# Patient Record
Sex: FEMALE | Race: WHITE | NOT HISPANIC OR LATINO | Employment: FULL TIME | URBAN - METROPOLITAN AREA
[De-identification: names, ages, dates, MRNs, and addresses within clinical notes are randomized per-mention and may not be internally consistent; named-entity substitution may affect disease eponyms.]

---

## 2021-05-02 ENCOUNTER — APPOINTMENT (EMERGENCY)
Dept: RADIOLOGY | Facility: HOSPITAL | Age: 46
End: 2021-05-02

## 2021-05-02 ENCOUNTER — HOSPITAL ENCOUNTER (EMERGENCY)
Facility: HOSPITAL | Age: 46
Discharge: HOME | End: 2021-05-02
Attending: EMERGENCY MEDICINE

## 2021-05-02 VITALS
SYSTOLIC BLOOD PRESSURE: 99 MMHG | RESPIRATION RATE: 18 BRPM | OXYGEN SATURATION: 98 % | TEMPERATURE: 97.8 F | WEIGHT: 181.66 LBS | DIASTOLIC BLOOD PRESSURE: 62 MMHG | HEART RATE: 58 BPM

## 2021-05-02 DIAGNOSIS — R07.9 CHEST PAIN: Primary | ICD-10-CM

## 2021-05-02 DIAGNOSIS — M79.669 CALF PAIN: ICD-10-CM

## 2021-05-02 DIAGNOSIS — S80.10XA SUPERFICIAL BRUISING OF LOWER LEG: ICD-10-CM

## 2021-05-02 DIAGNOSIS — R06.02 SHORTNESS OF BREATH: ICD-10-CM

## 2021-05-02 LAB
ALBUMIN SERPL BCP-MCNC: 3.4 G/DL (ref 3.5–5)
ALP SERPL-CCNC: 82 U/L (ref 46–116)
ALT SERPL W P-5'-P-CCNC: 23 U/L (ref 12–78)
ANION GAP SERPL CALCULATED.3IONS-SCNC: 7 MMOL/L (ref 4–13)
APTT PPP: 34 SECONDS (ref 23–37)
AST SERPL W P-5'-P-CCNC: 15 U/L (ref 5–45)
ATRIAL RATE: 75 BPM
BASOPHILS # BLD AUTO: 0.02 THOUSANDS/ΜL (ref 0–0.1)
BASOPHILS NFR BLD AUTO: 0 % (ref 0–1)
BILIRUB DIRECT SERPL-MCNC: 0.11 MG/DL (ref 0–0.2)
BILIRUB SERPL-MCNC: 0.32 MG/DL (ref 0.2–1)
BUN SERPL-MCNC: 16 MG/DL (ref 5–25)
CALCIUM SERPL-MCNC: 8.7 MG/DL (ref 8.3–10.1)
CHLORIDE SERPL-SCNC: 106 MMOL/L (ref 100–108)
CO2 SERPL-SCNC: 27 MMOL/L (ref 21–32)
CREAT SERPL-MCNC: 1.06 MG/DL (ref 0.6–1.3)
D DIMER PPP FEU-MCNC: <0.27 UG/ML FEU
EOSINOPHIL # BLD AUTO: 0.05 THOUSAND/ΜL (ref 0–0.61)
EOSINOPHIL NFR BLD AUTO: 1 % (ref 0–6)
ERYTHROCYTE [DISTWIDTH] IN BLOOD BY AUTOMATED COUNT: 12.2 % (ref 11.6–15.1)
EXT PREG TEST URINE: NEGATIVE
EXT. CONTROL ED NAV: NORMAL
GFR SERPL CREATININE-BSD FRML MDRD: 63 ML/MIN/1.73SQ M
GLUCOSE SERPL-MCNC: 86 MG/DL (ref 65–140)
HCT VFR BLD AUTO: 36.9 % (ref 34.8–46.1)
HGB BLD-MCNC: 12.1 G/DL (ref 11.5–15.4)
IMM GRANULOCYTES # BLD AUTO: 0.01 THOUSAND/UL (ref 0–0.2)
IMM GRANULOCYTES NFR BLD AUTO: 0 % (ref 0–2)
INR PPP: 1 (ref 0.84–1.19)
LYMPHOCYTES # BLD AUTO: 1.72 THOUSANDS/ΜL (ref 0.6–4.47)
LYMPHOCYTES NFR BLD AUTO: 32 % (ref 14–44)
MCH RBC QN AUTO: 31.3 PG (ref 26.8–34.3)
MCHC RBC AUTO-ENTMCNC: 32.8 G/DL (ref 31.4–37.4)
MCV RBC AUTO: 96 FL (ref 82–98)
MONOCYTES # BLD AUTO: 0.38 THOUSAND/ΜL (ref 0.17–1.22)
MONOCYTES NFR BLD AUTO: 7 % (ref 4–12)
NEUTROPHILS # BLD AUTO: 3.16 THOUSANDS/ΜL (ref 1.85–7.62)
NEUTS SEG NFR BLD AUTO: 60 % (ref 43–75)
NRBC BLD AUTO-RTO: 0 /100 WBCS
P AXIS: 32 DEGREES
PLATELET # BLD AUTO: 164 THOUSANDS/UL (ref 149–390)
PMV BLD AUTO: 10.4 FL (ref 8.9–12.7)
POTASSIUM SERPL-SCNC: 3.8 MMOL/L (ref 3.5–5.3)
PR INTERVAL: 148 MS
PROT SERPL-MCNC: 6.8 G/DL (ref 6.4–8.2)
PROTHROMBIN TIME: 13 SECONDS (ref 11.6–14.5)
QRS AXIS: 68 DEGREES
QRSD INTERVAL: 72 MS
QT INTERVAL: 358 MS
QTC INTERVAL: 399 MS
RBC # BLD AUTO: 3.86 MILLION/UL (ref 3.81–5.12)
SODIUM SERPL-SCNC: 140 MMOL/L (ref 136–145)
T WAVE AXIS: 49 DEGREES
TROPONIN I SERPL-MCNC: <0.02 NG/ML
TROPONIN I SERPL-MCNC: <0.02 NG/ML
VENTRICULAR RATE: 75 BPM
WBC # BLD AUTO: 5.34 THOUSAND/UL (ref 4.31–10.16)

## 2021-05-02 PROCEDURE — 85730 THROMBOPLASTIN TIME PARTIAL: CPT | Performed by: PHYSICIAN ASSISTANT

## 2021-05-02 PROCEDURE — 81025 URINE PREGNANCY TEST: CPT | Performed by: PHYSICIAN ASSISTANT

## 2021-05-02 PROCEDURE — 84484 ASSAY OF TROPONIN QUANT: CPT | Performed by: PHYSICIAN ASSISTANT

## 2021-05-02 PROCEDURE — 96374 THER/PROPH/DIAG INJ IV PUSH: CPT

## 2021-05-02 PROCEDURE — 85610 PROTHROMBIN TIME: CPT | Performed by: PHYSICIAN ASSISTANT

## 2021-05-02 PROCEDURE — 85025 COMPLETE CBC W/AUTO DIFF WBC: CPT | Performed by: PHYSICIAN ASSISTANT

## 2021-05-02 PROCEDURE — 93010 ELECTROCARDIOGRAM REPORT: CPT | Performed by: INTERNAL MEDICINE

## 2021-05-02 PROCEDURE — 36415 COLL VENOUS BLD VENIPUNCTURE: CPT | Performed by: PHYSICIAN ASSISTANT

## 2021-05-02 PROCEDURE — 80048 BASIC METABOLIC PNL TOTAL CA: CPT | Performed by: PHYSICIAN ASSISTANT

## 2021-05-02 PROCEDURE — 93005 ELECTROCARDIOGRAM TRACING: CPT

## 2021-05-02 PROCEDURE — 99285 EMERGENCY DEPT VISIT HI MDM: CPT

## 2021-05-02 PROCEDURE — 80076 HEPATIC FUNCTION PANEL: CPT | Performed by: PHYSICIAN ASSISTANT

## 2021-05-02 PROCEDURE — 99285 EMERGENCY DEPT VISIT HI MDM: CPT | Performed by: PHYSICIAN ASSISTANT

## 2021-05-02 PROCEDURE — 71045 X-RAY EXAM CHEST 1 VIEW: CPT

## 2021-05-02 PROCEDURE — 85379 FIBRIN DEGRADATION QUANT: CPT | Performed by: PHYSICIAN ASSISTANT

## 2021-05-02 RX ORDER — KETOROLAC TROMETHAMINE 30 MG/ML
30 INJECTION, SOLUTION INTRAMUSCULAR; INTRAVENOUS ONCE
Status: COMPLETED | OUTPATIENT
Start: 2021-05-02 | End: 2021-05-02

## 2021-05-02 RX ORDER — ASPIRIN 81 MG/1
324 TABLET, CHEWABLE ORAL ONCE
Status: COMPLETED | OUTPATIENT
Start: 2021-05-02 | End: 2021-05-02

## 2021-05-02 RX ADMIN — KETOROLAC TROMETHAMINE 30 MG: 30 INJECTION, SOLUTION INTRAMUSCULAR; INTRAVENOUS at 20:44

## 2021-05-02 RX ADMIN — ASPIRIN 324 MG: 81 TABLET, CHEWABLE ORAL at 20:43

## 2021-05-02 NOTE — ED PROVIDER NOTES
History  Chief Complaint   Patient presents with    Chest Pain     pt reports chest pain, sob, right leg pain/bruise  CP/sob started one hour ago  states she was in the car for 25 hours total in the past few days  76-year-old female with past medical history significant for anemia and WPW s/p ablation and resolution who presents to the emergency department accompanied by female partner at bedside for complaint of right calf pain and bruising, as well as chest pain and shortness of breath  Patient reports she traveled a total of 25 hours in a car in the past few days  Her last trip was 5 hours  Yesterday, she noticed a dull aching sensation in the right calf, which progressed to bruising on the anterior shin and worsening calf pain, radiating up the leg to the thigh  A few hours ago, patient reports she became acutely short of breath, both at rest and on exertion, with accompanying chest discomfort  She denies any dizziness, near-syncope or syncope, nausea or vomiting  She denies any trauma to lower leg  There is no history of DVT or PE, recent surgery or immobilization, history of malignancy, exogenous estrogen use  She is not a tobacco smoker or vaper  None       Past Medical History:   Diagnosis Date    Anemia        Past Surgical History:   Procedure Laterality Date    APPENDECTOMY       SECTION      NEPHRECTOMY         History reviewed  No pertinent family history  I have reviewed and agree with the history as documented  E-Cigarette/Vaping     E-Cigarette/Vaping Substances     Social History     Tobacco Use    Smoking status: Never Smoker   Substance Use Topics    Alcohol use: Never     Frequency: Never    Drug use: Never       Review of Systems   Constitutional: Negative for chills, fatigue and fever  HENT: Negative for congestion, rhinorrhea and sore throat  Respiratory: Positive for shortness of breath  Negative for cough, chest tightness and wheezing  Cardiovascular: Positive for chest pain and leg swelling  Negative for palpitations  Gastrointestinal: Negative for abdominal pain, nausea and vomiting  Musculoskeletal: Positive for myalgias  Negative for arthralgias, gait problem, joint swelling, neck pain and neck stiffness  Skin: Positive for color change  Negative for rash and wound  Neurological: Negative for dizziness, syncope, weakness, light-headedness and headaches  Hematological: Negative for adenopathy  All other systems reviewed and are negative  Physical Exam  Physical Exam  Vitals signs reviewed  Constitutional:       General: She is awake  She is not in acute distress  Appearance: Normal appearance  She is well-developed  She is not ill-appearing or toxic-appearing  HENT:      Head: Normocephalic and atraumatic  Mouth/Throat:      Lips: Pink  Mouth: Mucous membranes are moist       Pharynx: Oropharynx is clear  Uvula midline  Eyes:      Extraocular Movements: Extraocular movements intact  Conjunctiva/sclera: Conjunctivae normal       Pupils: Pupils are equal, round, and reactive to light  Neck:      Musculoskeletal: Full passive range of motion without pain, normal range of motion and neck supple  Cardiovascular:      Rate and Rhythm: Normal rate and regular rhythm  Pulses: Normal pulses  Pulmonary:      Effort: Pulmonary effort is normal       Breath sounds: Normal breath sounds and air entry  Chest:      Chest wall: No tenderness  Musculoskeletal: Normal range of motion  Right ankle: She exhibits normal range of motion, no swelling, no ecchymosis, no deformity, no laceration and normal pulse  No tenderness  Right lower leg: She exhibits tenderness  She exhibits no bony tenderness, no swelling (+bruising (in multiple stages of healing) of shin), no deformity and no laceration  Legs:       Right foot: Normal range of motion and normal capillary refill   No tenderness, bony tenderness, swelling, crepitus or deformity  Comments: Compartments soft; sensation intact proximally and distally; skin warm; able to ambulate independently    Skin:     General: Skin is warm  Capillary Refill: Capillary refill takes less than 2 seconds  Findings: No erythema, lesion or rash  Neurological:      Mental Status: She is alert and oriented to person, place, and time  Psychiatric:         Behavior: Behavior is cooperative           Vital Signs  ED Triage Vitals   Temperature Pulse Respirations Blood Pressure SpO2   05/02/21 1929 05/02/21 1929 05/02/21 1929 05/02/21 1934 05/02/21 1929   97 8 °F (36 6 °C) 86 16 131/61 98 %      Temp Source Heart Rate Source Patient Position - Orthostatic VS BP Location FiO2 (%)   05/02/21 1929 05/02/21 1929 05/02/21 2145 05/02/21 2145 --   Oral Monitor Lying Right arm       Pain Score       05/02/21 2044       6           Vitals:    05/02/21 1929 05/02/21 1934 05/02/21 2145 05/02/21 2311   BP:  131/61 104/57 99/62   Pulse: 86  63 58   Patient Position - Orthostatic VS:   Lying Lying         Visual Acuity      ED Medications  Medications   aspirin chewable tablet 324 mg (324 mg Oral Given 5/2/21 2043)   ketorolac (TORADOL) injection 30 mg (30 mg Intravenous Given 5/2/21 2044)       Diagnostic Studies  Results Reviewed     Procedure Component Value Units Date/Time    Troponin I [[id number]]  (Normal) Collected: 05/02/21 2239    Lab Status: Final result Specimen: Blood from Arm, Right Updated: 05/02/21 2300     Troponin I <0 02 ng/mL     POCT pregnancy, urine [[id number]]  (Normal) Resulted: 05/02/21 2021    Lab Status: Final result Updated: 05/02/21 2021     EXT PREG TEST UR (Ref: Negative) negative     Control valid    Troponin I [[id number]]  (Normal) Collected: 05/02/21 1946    Lab Status: Final result Specimen: Blood from Arm, Right Updated: 05/02/21 2010     Troponin I <0 02 ng/mL     Basic metabolic panel [[id number]] Collected: 05/02/21 1946    Lab Status: Final result Specimen: Blood from Arm, Right Updated: 05/02/21 2008     Sodium 140 mmol/L      Potassium 3 8 mmol/L      Chloride 106 mmol/L      CO2 27 mmol/L      ANION GAP 7 mmol/L      BUN 16 mg/dL      Creatinine 1 06 mg/dL      Glucose 86 mg/dL      Calcium 8 7 mg/dL      eGFR 63 ml/min/1 73sq m     Narrative:      [place] guidelines for Chronic Kidney Disease (CKD):     Stage 1 with normal or high GFR (GFR > 90 mL/min/1 73 square meters)    Stage 2 Mild CKD (GFR = 60-89 mL/min/1 73 square meters)    Stage 3A Moderate CKD (GFR = 45-59 mL/min/1 73 square meters)    Stage 3B Moderate CKD (GFR = 30-44 mL/min/1 73 square meters)    Stage 4 Severe CKD (GFR = 15-29 mL/min/1 73 square meters)    Stage 5 End Stage CKD (GFR <15 mL/min/1 73 square meters)  Note: GFR calculation is accurate only with a steady state creatinine    Hepatic function panel [[id number]]  (Abnormal) Collected: 05/02/21 1946    Lab Status: Final result Specimen: Blood from Arm, Right Updated: 05/02/21 2008     Total Bilirubin 0 32 mg/dL      Bilirubin, Direct 0 11 mg/dL      Alkaline Phosphatase 82 U/L      AST 15 U/L      ALT 23 U/L      Total Protein 6 8 g/dL      Albumin 3 4 g/dL     D-Dimer [[id number]]  (Normal) Collected: 05/02/21 1946    Lab Status: Final result Specimen: Blood from Arm, Right Updated: 05/02/21 2007     D-Dimer, Quant <0 27 ug/ml FEU     Protime-INR [[id number]]  (Normal) Collected: 05/02/21 1946    Lab Status: Final result Specimen: Blood from Arm, Right Updated: 05/02/21 2004     Protime 13 0 seconds      INR 1 00    APTT [[id number]]  (Normal) Collected: 05/02/21 1946    Lab Status: Final result Specimen: Blood from Arm, Right Updated: 05/02/21 2004     PTT 34 seconds     CBC and differential [[id number]] Collected: 05/02/21 1946    Lab Status: Final result Specimen: Blood from Arm, Right Updated: 05/02/21 1953     WBC 5 34 Thousand/uL      RBC 3 86 Million/uL      Hemoglobin 12 1 g/dL      Hematocrit 36 9 %      MCV 96 fL      MCH 31 3 pg      MCHC 32 8 g/dL      RDW 12 2 %      MPV 10 4 fL      Platelets 253 Thousands/uL      nRBC 0 /100 WBCs      Neutrophils Relative 60 %      Immat GRANS % 0 %      Lymphocytes Relative 32 %      Monocytes Relative 7 %      Eosinophils Relative 1 %      Basophils Relative 0 %      Neutrophils Absolute 3 16 Thousands/µL      Immature Grans Absolute 0 01 Thousand/uL      Lymphocytes Absolute 1 72 Thousands/µL      Monocytes Absolute 0 38 Thousand/µL      Eosinophils Absolute 0 05 Thousand/µL      Basophils Absolute 0 02 Thousands/µL                  XR chest 1 view portable    (Results Pending)              Procedures  Procedures         ED Course  ED Course as of May 03 0448   Sun May 02, 2021   1938 EKG shows normal sinus rhythm, rate 75, no acute ST segment elevation or depression, no T-wave inversion, , QRS duration 72, IA interval 148, no arrhythmia or heart block      2237 EKG shows junctional rhythm, rate 61, no acute ST segment elevation or depression, no T-wave inversion, , QRS duration 62, no arrhythmia or heart block      2305 Labs overall unremarkable  Negative dimer  Trop negative x2  No EKG changes  On re-assessment, patient reports she feels better and is resting comfortably  Vitals are improved  Should f/u with PCP for further evaluation  HEART Risk Score      Most Recent Value   Heart Score Risk Calculator   History  0 Filed at: 05/02/2021 2303   ECG  0 Filed at: 05/02/2021 2303   Age  1 Filed at: 05/02/2021 2303   Risk Factors  0 Filed at: 05/02/2021 2303   Troponin  0 Filed at: 05/02/2021 2303   HEART Score  1 Filed at: 05/02/2021 2303                      SBIRT 22yo+      Most Recent Value   SBIRT (25 yo +)   In order to provide better care to our patients, we are screening all of our patients for alcohol and drug use  Would it be okay to ask you these screening questions?   No Filed at: 05/02/2021 2016 MDM  Number of Diagnoses or Management Options  Calf pain:   Chest pain:   Shortness of breath:   Superficial bruising of lower leg:   Diagnosis management comments: On exam, well-appearing female, no acute distress, nontoxic appearance, afebrile, vitals unremarkable, awake alert oriented, + bruising on the right shin, + calf swelling with posterior tenderness, no varicose veins or palpable cord or lump, lungs clear to auscultation bilaterally, no tachypnea or hypoxia, no signs of respiratory distress, resting comfortably sitting down  Will screen for clot with dimer and obtain PE study of elevated  Will check chest x-ray for any viral pattern which may suggest COVID-19 infection, given recent travel  Also consider ACS, GERD, MSK chest wall pain, anxiety        Amount and/or Complexity of Data Reviewed  Clinical lab tests: ordered and reviewed  Tests in the radiology section of CPT®: ordered and reviewed  Tests in the medicine section of CPT®: ordered and reviewed  Discussion of test results with the performing providers: yes  Decide to obtain previous medical records or to obtain history from someone other than the patient: yes  Obtain history from someone other than the patient: yes  Review and summarize past medical records: yes  Discuss the patient with other providers: yes  Independent visualization of images, tracings, or specimens: yes    Patient Progress  Patient progress: improved (See ED course note for dispo and plan  I reviewed and discussed all lab and imaging findings with the patient at bedside  I discussed emergency department return parameters  I answered any and all questions the patient had regarding emergency department course of evaluation and treatment   The patient verbalized understanding of and agreement with plan   )      Disposition  Final diagnoses:   Chest pain   Shortness of breath   Calf pain   Superficial bruising of lower leg     Time reflects when diagnosis was documented in both MDM as applicable and the Disposition within this note     Time User Action Codes Description Comment    5/2/2021 11:03 PM Raymon Primas Add [R07 9] Chest pain     5/2/2021 11:03 PM Raymon Primas Add [R06 02] Shortness of breath     5/2/2021 11:03 PM Raymon Primas Add [J56 280] Calf pain     5/2/2021 11:04 PM Raymon Primas Add [S80 10XA] Superficial bruising of lower leg       ED Disposition     ED Disposition Condition Date/Time Comment    Discharge Stable Sun May 2, 2021 11:03 PM Mark Cook discharge to home/self care  Follow-up Information     Follow up With Specialties Details Why Contact Info Additional Information    [address] [facility] Emergency Medicine Go to  If symptoms worsen [facility] [address]  [facility] Department, [address] [place] [address], [place] [address] Primary Care Family Medicine Schedule an appointment as soon as possible for a visit in 1 day For further evaluation [facility]  [facility] [address] [address], [address] [place] [address], [id number] [phone]          There are no discharge medications for this patient  No discharge procedures on file      PDMP Review     None          ED Provider  Electronically Signed by           Drake Vasquez PA-C  05/03/21 0035

## 2021-05-03 LAB
ATRIAL RATE: 61 BPM
QRS AXIS: 62 DEGREES
QRSD INTERVAL: 62 MS
QT INTERVAL: 404 MS
QTC INTERVAL: 406 MS
T WAVE AXIS: 43 DEGREES
VENTRICULAR RATE: 61 BPM

## 2021-05-03 PROCEDURE — 93010 ELECTROCARDIOGRAM REPORT: CPT | Performed by: INTERNAL MEDICINE

## 2021-09-23 ENCOUNTER — APPOINTMENT (OUTPATIENT)
Dept: URGENT CARE | Facility: CLINIC | Age: 46
End: 2021-09-23

## 2021-09-23 DIAGNOSIS — Z02.1 PRE-EMPLOYMENT HEALTH SCREENING EXAMINATION: Primary | ICD-10-CM

## 2021-09-23 LAB — RUBV IGG SERPL IA-ACNC: 154.8 IU/ML

## 2021-09-23 PROCEDURE — 86787 VARICELLA-ZOSTER ANTIBODY: CPT | Performed by: PHYSICIAN ASSISTANT

## 2021-09-23 PROCEDURE — 86480 TB TEST CELL IMMUN MEASURE: CPT | Performed by: PHYSICIAN ASSISTANT

## 2021-09-23 PROCEDURE — 86765 RUBEOLA ANTIBODY: CPT | Performed by: PHYSICIAN ASSISTANT

## 2021-09-23 PROCEDURE — 86762 RUBELLA ANTIBODY: CPT | Performed by: PHYSICIAN ASSISTANT

## 2021-09-23 PROCEDURE — 86735 MUMPS ANTIBODY: CPT | Performed by: PHYSICIAN ASSISTANT

## 2021-09-24 LAB — VZV IGG SER IA-ACNC: NORMAL

## 2021-09-27 LAB
GAMMA INTERFERON BACKGROUND BLD IA-ACNC: 0.18 IU/ML
M TB IFN-G BLD-IMP: NEGATIVE
M TB IFN-G CD4+ BCKGRND COR BLD-ACNC: 0.21 IU/ML
M TB IFN-G CD4+ BCKGRND COR BLD-ACNC: 0.26 IU/ML
MITOGEN IGNF BCKGRD COR BLD-ACNC: >10 IU/ML

## 2021-09-28 LAB
MEV IGG SER QL: NORMAL
MUV IGG SER QL: NORMAL

## 2021-10-20 ENCOUNTER — TELEPHONE (OUTPATIENT)
Dept: NEUROLOGY | Facility: CLINIC | Age: 46
End: 2021-10-20

## 2022-01-31 ENCOUNTER — TELEPHONE (OUTPATIENT)
Dept: NEUROLOGY | Facility: CLINIC | Age: 47
End: 2022-01-31

## 2022-01-31 NOTE — TELEPHONE ENCOUNTER
Called patient and I left a message to call back to reschedule her 05/17/22 appointment with Dr Melani Raman as she will be out of the office  This can be rescheduled with an AP that does new patient's for 60 minutes

## 2022-02-09 ENCOUNTER — TELEPHONE (OUTPATIENT)
Dept: NEUROLOGY | Facility: CLINIC | Age: 47
End: 2022-02-09

## 2022-02-09 NOTE — TELEPHONE ENCOUNTER
Tana called and asked for a sooner appointment then 2/23/22 because her headaches are be coming more frequent and now see is seeing floaters as well  I reschedule her with Ciro Eason at the [facility] location for 8am,

## 2022-02-14 ENCOUNTER — TELEPHONE (OUTPATIENT)
Dept: NEUROLOGY | Facility: CLINIC | Age: 47
End: 2022-02-14

## 2022-02-14 NOTE — TELEPHONE ENCOUNTER
Called and left a voicemail for patient - Please call back to confirm upcoming appointment with [facility]  Provided patient with apt date, time and location  Informed patient that check in is at least 15 minutes prior to apt time

## 2022-02-17 ENCOUNTER — OFFICE VISIT (OUTPATIENT)
Dept: NEUROLOGY | Facility: CLINIC | Age: 47
End: 2022-02-17
Payer: COMMERCIAL

## 2022-02-17 VITALS
TEMPERATURE: 98 F | WEIGHT: 194 LBS | SYSTOLIC BLOOD PRESSURE: 106 MMHG | HEIGHT: 65 IN | DIASTOLIC BLOOD PRESSURE: 60 MMHG | RESPIRATION RATE: 16 BRPM | HEART RATE: 77 BPM | BODY MASS INDEX: 32.32 KG/M2

## 2022-02-17 DIAGNOSIS — H53.8 BLURRED VISION, RIGHT EYE: ICD-10-CM

## 2022-02-17 DIAGNOSIS — G43.009 MIGRAINE WITHOUT AURA AND WITHOUT STATUS MIGRAINOSUS, NOT INTRACTABLE: ICD-10-CM

## 2022-02-17 DIAGNOSIS — R20.2 PARESTHESIAS: ICD-10-CM

## 2022-02-17 DIAGNOSIS — R42 DIZZINESS: Primary | ICD-10-CM

## 2022-02-17 DIAGNOSIS — R53.1 RIGHT SIDED WEAKNESS: ICD-10-CM

## 2022-02-17 DIAGNOSIS — R07.89 CHEST PRESSURE: ICD-10-CM

## 2022-02-17 PROCEDURE — 99204 OFFICE O/P NEW MOD 45 MIN: CPT | Performed by: NURSE PRACTITIONER

## 2022-02-17 RX ORDER — KETOROLAC TROMETHAMINE 10 MG/1
10 TABLET, FILM COATED ORAL 3 TIMES DAILY PRN
COMMUNITY
Start: 2022-01-25

## 2022-02-17 RX ORDER — METHYLPREDNISOLONE 4 MG/1
TABLET ORAL AS NEEDED
COMMUNITY
Start: 2021-12-20

## 2022-02-17 RX ORDER — RIZATRIPTAN BENZOATE 10 MG/1
10 TABLET ORAL
COMMUNITY
Start: 2021-09-27 | End: 2022-09-27

## 2022-02-17 RX ORDER — FERROUS SULFATE 325(65) MG
1 TABLET ORAL
COMMUNITY
Start: 2021-12-06

## 2022-02-17 RX ORDER — HYDROXYCHLOROQUINE SULFATE 200 MG/1
200 TABLET, FILM COATED ORAL 2 TIMES DAILY
COMMUNITY
Start: 2021-12-20

## 2022-02-17 NOTE — ASSESSMENT & PLAN NOTE
Intermittent episodes of right sided weakness/paresthesias that started 5 months ago and occur for days-weeks at a time  Nonspecific white matter changes on MRI performed in September  Exam today is nonfocal-patient is currently asymptomatic  Recommend she let us know if any repeat episodes, get MRI performed to help rule out demyelinating cause and follow up with Dr Devora Nissen in 2 months

## 2022-02-17 NOTE — ASSESSMENT & PLAN NOTE
Longstanding migraines  No recent change in frequency, location, intensity  1x every couple of months  Easily aborted by maxalt or Toradol-not overusing  Continue current treatment

## 2022-02-17 NOTE — ASSESSMENT & PLAN NOTE
Had cardiac workup-ok  Describes pressure feeling that goes all around the upper thorax- responded to steroids in past   MRI's for further evaluation

## 2022-02-17 NOTE — PATIENT INSTRUCTIONS
Get blood work as scheduled  Do MRI's- get discs from [facility]  We will request recent eye exam results  Let us know of repeat symptoms  Continue healthy lifestyle  Follow up in 2 months

## 2022-02-17 NOTE — PROGRESS NOTES
Patient ID: Claudia Morel is a 51 y o  female  Assessment/Plan:  Patient Instructions:  Get blood work as scheduled  Do MRI's- get discs from [facility]  We will request recent eye exam results  Let us know of repeat symptoms  Continue healthy lifestyle  Follow up in 2 months    Paresthesias  Intermittent episodes of right sided weakness/paresthesias that started 5 months ago and occur for days-weeks at a time  Nonspecific white matter changes on MRI performed in September  Exam today is nonfocal-patient is currently asymptomatic  Recommend she let us know if any repeat episodes, get MRI performed to help rule out demyelinating cause and follow up with Dr Fuad Jasso in 2 months  Migraine without aura and without status migrainosus, not intractable  Longstanding migraines  No recent change in frequency, location, intensity  1x every couple of months  Easily aborted by maxalt or Toradol-not overusing  Continue current treatment  Blurred vision, right eye  Will request recent eye exam  Suggest she get repeat eye exam if possible when symptomatic    Chest pressure  Had cardiac workup-ok  Describes pressure feeling that goes all around the upper thorax- responded to steroids in past   MRI's for further evaluation    We did discuss red flag headache/neurological symptoms and reasons to seek more emergent medical attention       Diagnoses and all orders for this visit:    Dizziness  -     MRI brain MS wo and w contrast; Future    Right sided weakness  Comments:  intermittent  Orders:  -     MRI brain MS wo and w contrast; Future  -     MRI cervical spine with and without contrast; Future  -     MRI thoracic spine with and without contrast; Future    Paresthesias  -     MRI brain MS wo and w contrast; Future  -     MRI cervical spine with and without contrast; Future  -     MRI thoracic spine with and without contrast; Future    Migraine without aura and without status migrainosus, not intractable    Chest pressure  -     MRI thoracic spine with and without contrast; Future    Blurred vision, right eye    Other orders  -     cyanocobalamin (VITAMIN B-12) 1000 MCG tablet; Take 1,000 mcg by mouth daily  -     ferrous sulfate 325 (65 Fe) mg tablet; Take 1 tablet by mouth daily with breakfast  -     hydroxychloroquine (PLAQUENIL) 200 mg tablet; Take 200 mg by mouth 2 (two) times a day  -     ketorolac (TORADOL) 10 mg tablet; Take 10 mg by mouth Three times daily as needed  -     methylPREDNISolone 4 MG tablet therapy pack; if needed    -     rizatriptan (MAXALT) 10 MG tablet; Take 10 mg by mouth         Subjective:    HPI  Cheikh Gonzalez is a 51year old female who presents to the office today as a new patient for evaluation of intermittent primarily right sided paresthesias and weakness and also intermittent right blurred vision not always associated with headache  She is with her spouse who also helps provide history  She has a medical history/surgical history of iron deficiency (being treated po- had infusions in past), elevated AMAURY (1:640; repeat 1:320) diagnosed by rheumatology with UCTD and on plaquenil now, gastric sleeve surgery in 2017- lost 100 lbs and kept it off since then- on vitamins, migraine for many years which never needed prevention and has been stable at 1x every couple months treated effectively in 2 hours by maxalt or toradol prn, currie parkinson white s/p ablation without repeat episodes, 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, c sections  She does not use tobacco, alcohol, or drugs  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 from [employer] in October); she does do 24 hour shifts at times; she is also busy with PA school  She states she hydrates well with water; has regular diet/appetite, and is using bathroom without difficulty  She denies any significant sleep difficulty  She does keep active by doing hikes/walks  The first time she ever had these symptoms was 9/11/2021   She states she had just got done riding a motorcycle and she felt as if her face and her back were wet  She was speaking with her spouse and she noticed slurred speech  She was also off balance with walking  It took the patient time to be convinced to go to ED, but symptoms had resolved some when she arrived (except the right sided numbness)  While there she got aspirin and they ruled out stroke with use of MRI/MRA  She was discharged  She states since discharge she has had repeat episodes of right sided paresthesias that can last for days to a week at a time  Sometimes it will just be the face, other times the right arm/right leg  Sometimes it is accompanied by right eye blurred vision/peripheral vision loss without eye pain  There are no specific triggers for these episodes and often they are not accompanied by headaches  More recently she has also had intermittent chest "squeezing" /pressure  She has been treated with medrol dose pack by her rheumatologist  Her last episode was 1 week ago  She has upcoming repeat lab work  She has noticed no difference in these symptoms since being on plaquenil  Testing:  BUN 14  Creat 0 86  Na136  K 4 0  AST 23  ALT21  GFR 81  TSH 2 19  CRP 4 2  ACE 25  Normal SPEP  Sed rate 16  Neg lyme  Ferritin 11  Normal Iron panel    B12 468  RF neg  AMAURY pos- neg reflex for SSA/Smith/SCL-70, dsDNA    EKG 9/11/2021:  INTERPRETATION:   SINUS BRADYCARDIA   OTHERWISE NORMAL ECG   WHEN COMPARED WITH ECG OF 14-JUL-2021 17:55,   NO SIGNIFICANT CHANGE WAS FOUND   Confirmed by LACHO CARREON, Edwin Peñaloza ([id number]) on 9/11/2021 7:41:12 AM    MRI/MRA Brain/neck w/wo 9/11/2021:  MRI of the brain:   Brain parenchyma: Normal for age  There are a few scattered nonspecific foci of   gliosis or demyelination in the white matter  Ventricular system, basal cisterns and extra-axial spaces: Appropriate for age  Major vascular structures: Normal    Intracranial hemorrhage: None  Midline shift: None     Skull base & Calvarium: Normal    Paranasal sinuses and mastoid air cells: Clear  Visualized orbits: Normal    Sella: Normal      MR angiogram of the [place]:   Internal Carotid Arteries: No hemodynamically significant stenosis or saccular   aneurysm  Anterior Cerebral Arteries: No hemodynamically significant stenosis or saccular   aneurysm  Middle Cerebral Arteries: No hemodynamically significant stenosis or saccular   aneurysm  Vertebrobasilar system: No hemodynamically significant stenosis or saccular   aneurysm  MR angiogram of the neck:   Aortic arch & Origins: Vessel origins from the aortic arch are patent  Left Carotid:No hemodynamically significant stenosis  Right Carotid: No hemodynamically significant stenosis  Left Vertebral: No hemodynamically significant stenosis  Right Vertebral: No hemodynamically significant stenosis  The following portions of the patient's history were reviewed and updated as appropriate: allergies, current medications, past family history, past medical history, past social history, past surgical history and problem list          Objective:    Blood pressure 106/60, pulse 77, temperature 98 °F (36 7 °C), temperature source Temporal, resp  rate 16, height 5' 5" (1 651 m), weight 88 kg (194 lb)  Physical Exam  Vitals reviewed  Constitutional:       General: She is not in acute distress  Appearance: She is not ill-appearing, toxic-appearing or diaphoretic  HENT:      Head: Normocephalic  Right Ear: External ear normal       Left Ear: External ear normal       Mouth/Throat:      Mouth: Mucous membranes are moist       Pharynx: Oropharynx is clear  Eyes:      General: Lids are normal          Right eye: No discharge  Left eye: No discharge  Extraocular Movements: Extraocular movements intact  Pupils: Pupils are equal, round, and reactive to light        Comments: Fundi appear normal on this undilated exam   Cardiovascular: Rate and Rhythm: Normal rate and regular rhythm  Pulses: Normal pulses  Heart sounds: Normal heart sounds  Pulmonary:      Effort: Pulmonary effort is normal       Breath sounds: Normal breath sounds  Abdominal:      General: Bowel sounds are normal  There is no distension  Musculoskeletal:         General: Normal range of motion  Cervical back: Normal range of motion  No rigidity or tenderness  Right lower leg: No edema  Left lower leg: No edema  Skin:     General: Skin is warm and dry  Neurological:      General: No focal deficit present  Coordination: Romberg sign negative  Deep Tendon Reflexes: Strength normal and reflexes are normal and symmetric  Reflexes normal    Psychiatric:         Mood and Affect: Mood normal          Speech: Speech normal          Behavior: Behavior normal          Neurological Exam  Mental Status  Awake, alert and oriented to person, place and time  Oriented to person, place and time  Speech is normal  Language is fluent with no aphasia  Cranial Nerves  CN II: Vision test: Fundi appear normal on this undilated exam  Visual acuity is normal  Visual fields full to confrontation  CN III, IV, VI: Extraocular movements intact bilaterally  Normal lids and orbits bilaterally  Pupils equal round and reactive to light bilaterally  CN V: Facial sensation is normal   CN VII: Full and symmetric facial movement  CN VIII: Hearing is normal   CN IX, X: Palate elevates symmetrically  Normal gag reflex  CN XI: Shoulder shrug strength is normal   CN XII: Tongue midline without atrophy or fasciculations  Motor  Normal muscle bulk throughout  Normal muscle tone  No abnormal involuntary movements  Strength is 5/5 throughout all four extremities  Sensory  Light touch is normal in upper and lower extremities  Vibration is normal in upper and lower extremities       Reflexes  Deep tendon reflexes are 2+ and symmetric in all four extremities with downgoing toes bilaterally  Coordination  Right: Finger-to-nose normal   Left: Finger-to-nose normal     Gait  Casual gait is normal including stance, stride, and arm swing  Romberg is absent  Able to rise from chair without using arms  ROS:    Review of Systems   Constitutional: Negative  Negative for appetite change and fever  HENT: Negative  Negative for hearing loss, tinnitus, trouble swallowing and voice change  Eyes: Negative  Negative for photophobia and pain  Blurry vision   Respiratory: Positive for chest tightness  Negative for shortness of breath  Cardiovascular: Negative  Negative for palpitations  Gastrointestinal: Negative  Negative for nausea and vomiting  Endocrine: Negative  Negative for cold intolerance  Genitourinary: Negative  Negative for dysuria, frequency and urgency  Musculoskeletal: Positive for myalgias  Negative for neck pain  Skin: Negative  Negative for rash  Neurological: Positive for dizziness, weakness (R sided) and numbness (Facial)  Negative for tremors, seizures, syncope, facial asymmetry, speech difficulty, light-headedness and headaches  Off balanced   Hematological: Negative  Does not bruise/bleed easily  Psychiatric/Behavioral: Negative  Negative for confusion, hallucinations and sleep disturbance       ROS was reviewed and updated as appropriate

## 2022-03-02 ENCOUNTER — TELEPHONE (OUTPATIENT)
Dept: NEUROLOGY | Facility: CLINIC | Age: 47
End: 2022-03-02

## 2022-03-02 NOTE — TELEPHONE ENCOUNTER
Partner calling to request sooner appt with Dr PURDY due to symptoms  Patient has last MRI scheduled for 3/23/2022 so looking for something after that  Rescheduled as requested

## 2022-03-09 ENCOUNTER — TELEPHONE (OUTPATIENT)
Dept: NEUROLOGY | Facility: CLINIC | Age: 47
End: 2022-03-09

## 2022-03-09 DIAGNOSIS — R53.1 RIGHT SIDED WEAKNESS: Primary | ICD-10-CM

## 2022-03-09 NOTE — TELEPHONE ENCOUNTER
Orders entered for CMP  Detailed message left for patient informing of previous  Requested a call back if any questions

## 2022-03-09 NOTE — TELEPHONE ENCOUNTER
[facility] calling because the patient is scheduled for her MRI tomorrow evening at 7:30PM and MRI dept is stating that the patient needs her BUN and creatinine  Please place order in and call patient to advise that labs need to be completed before her MRI  Thank you

## 2022-03-10 ENCOUNTER — HOSPITAL ENCOUNTER (OUTPATIENT)
Dept: MRI IMAGING | Facility: HOSPITAL | Age: 47
Discharge: HOME | End: 2022-03-10
Payer: COMMERCIAL

## 2022-03-10 DIAGNOSIS — R20.2 PARESTHESIAS: ICD-10-CM

## 2022-03-10 DIAGNOSIS — R42 DIZZINESS: ICD-10-CM

## 2022-03-10 DIAGNOSIS — R53.1 RIGHT SIDED WEAKNESS: ICD-10-CM

## 2022-03-10 PROCEDURE — G1004 CDSM NDSC: HCPCS

## 2022-03-10 PROCEDURE — A9585 GADOBUTROL INJECTION: HCPCS | Performed by: NURSE PRACTITIONER

## 2022-03-10 PROCEDURE — 70553 MRI BRAIN STEM W/O & W/DYE: CPT

## 2022-03-10 PROCEDURE — 72156 MRI NECK SPINE W/O & W/DYE: CPT

## 2022-03-10 RX ADMIN — GADOBUTROL 9 ML: 604.72 INJECTION INTRAVENOUS at 19:33

## 2022-03-10 NOTE — TELEPHONE ENCOUNTER
Pt calling back  Said she had a CMP done at [facility] on 3/4/22  Results are visible now on chart

## 2022-03-23 ENCOUNTER — HOSPITAL ENCOUNTER (OUTPATIENT)
Dept: MRI IMAGING | Facility: HOSPITAL | Age: 47
Discharge: HOME | End: 2022-03-23
Payer: COMMERCIAL

## 2022-03-23 DIAGNOSIS — R20.2 PARESTHESIAS: ICD-10-CM

## 2022-03-23 DIAGNOSIS — R53.1 RIGHT SIDED WEAKNESS: ICD-10-CM

## 2022-03-23 DIAGNOSIS — R07.89 CHEST PRESSURE: ICD-10-CM

## 2022-03-23 PROCEDURE — A9585 GADOBUTROL INJECTION: HCPCS | Performed by: NURSE PRACTITIONER

## 2022-03-23 PROCEDURE — 72157 MRI CHEST SPINE W/O & W/DYE: CPT

## 2022-03-23 PROCEDURE — G1004 CDSM NDSC: HCPCS

## 2022-03-23 RX ADMIN — GADOBUTROL 9 ML: 604.72 INJECTION INTRAVENOUS at 08:44

## 2022-03-31 ENCOUNTER — OFFICE VISIT (OUTPATIENT)
Dept: NEUROLOGY | Facility: CLINIC | Age: 47
End: 2022-03-31
Payer: COMMERCIAL

## 2022-03-31 VITALS
HEART RATE: 70 BPM | DIASTOLIC BLOOD PRESSURE: 53 MMHG | SYSTOLIC BLOOD PRESSURE: 91 MMHG | WEIGHT: 194.1 LBS | BODY MASS INDEX: 32.34 KG/M2 | TEMPERATURE: 97.1 F | HEIGHT: 65 IN

## 2022-03-31 DIAGNOSIS — R20.2 PARESTHESIAS: Primary | ICD-10-CM

## 2022-03-31 PROCEDURE — 99215 OFFICE O/P EST HI 40 MIN: CPT | Performed by: PSYCHIATRY & NEUROLOGY

## 2022-03-31 RX ORDER — ERGOCALCIFEROL (VITAMIN D2) 1250 MCG
50000 CAPSULE ORAL
COMMUNITY
Start: 2022-03-08 | End: 2022-05-31

## 2022-03-31 NOTE — PROGRESS NOTES
[facility]ULTIPLE SCLEROSIS [facility]  PATIENT:  Miroslava Thomson  MRN:  [id number]  :  1975  DATE OF SERVICE:  3/31/2022    Assessment/Plan:       Problem List Items Addressed This Visit        Other    Paresthesias - Primary    Relevant Orders    EEG awake or drowsy routine         Mrs  MED [facility] has presented to [facility] multiple [address] for evaluation  Patient describing intermittent events of sensory dysfunction involving her right face and right upper extremity lasting from couple hours to several days, in addition to unsteadiness and chest tightness  Patient may experience brain fog and blurriness  Recurrent events of similar clinical presentation bring concern for TIAs versus epileptiform events versus autoimmune disorder related relapses; We personally reviewed patient brain imaging, patient has no acute or chronic ischemic or hemorrhagic changes, no neoplasm, patient has two small white matter changes, no concerning related to healthy patient aging  We also reviewed patient's cervical and thoracic spine, aside from single area of C6-C7 degenerative changes without potential cord compression patient spinal cord has no intrinsic pathology or any other changes  Today we concluded patient has no multiple sclerosis or any other CNS demyelination  Patient will be offered to consider EEG as she has paroxysmal events of recurrent sensory dysfunction the same area, no loss of consciousness or postictal state has been described  Patient is to continue vitamin B12 supplements in addition to vitamin-D supplements  We extensively discussed potential intermittent hypercoagulable state, patient has no family history of blood clotting disease  Patient has no focal neurological dysfunction on today's exam      Patient described her migraines are not significant, she may intermittently use rizatriptan versus Excedrin  Patient is to follow with Rheumatology team on scheduled basis      Patient is to follow with [facility] advanced practitioner team within 3 months  Subjective: Intermittent sensory dysfunction right side of the face and right upper extremity    HPI  Mrs Elsa Colo is a 67-year-old female who was referred to [facility] multiple [address] for evaluation  Patient was recently seen by Neurology team for paresthesia and blurred vision  Patient describing intermittent bouts of chest pressure starting in September 2021  Patient also have events of unsteadiness with right-sided face and right upper extremity sensory dysfunction lasting for couple hours  Patient described no headaches  Patient describes sensation of blurriness and brain fog with changes in her voice  Chest tightness has been noted on several occasions  Patient has been treated for next connective tissue disorder by Rheumatology team with steroid pack was provided  Patient has family history of rheumatoid arthritis in her mother  Imaging studies of the brain/cervical and thoracic spine were reviewed today, no concern for CNS demyelination/multiple sclerosis  Patient previously described Intermittent episodes of right sided weakness/paresthesias that started 5 months ago and occur for days-weeks at a time  Imaging of the brain, cervical and thoracic spine were completed and within normal range  MRI brain : No acute intracranial abnormality or pathologic intracranial enhancement      Few scattered punctate foci of nonspecific cerebral white matter signal abnormality  The pattern of signal abnormality favors sequela of complicated migraines rather than demyelinating disease of multiple sclerosis or other etiologies such as chronic   microangiopathy, Lyme's disease, or sequela of collagen vascular disease      MRI C-spine 2022: No cord signal abnormality or enhancement to suggest demyelinating disease      Mild noncompressive cervical degenerative discogenic disease      Endplate degenerative changes at C6-C7  MRI T-spine:    Unremarkable MRI of the thoracic spine      No cord lesions identified to suggest demyelinating disease  Serologic workup vitamin-D 20 ng/mL;  B12 461  Pg/ml; dsDNA/SSA/SSB/Sm/RNP/SCL negative; ESR/CRP negative  The following portions of the patient's history were reviewed and updated as appropriate:   She  has a past medical history of Anemia  She   Patient Active Problem List    Diagnosis Date Noted    Paresthesias 2022    Blurred vision, right eye 2022    Chest pressure 2022    Migraine without aura and without status migrainosus, not intractable 2022     She  has a past surgical history that includes Nephrectomy; Appendectomy;  section; Gastric bypass (2017); and Cholecystectomy ()  Her family history includes Diabetes in her sister; Heart disease in her mother  She  reports that she has never smoked  She does not have any smokeless tobacco history on file  She reports that she does not drink alcohol and does not use drugs  Current Outpatient Medications   Medication Sig Dispense Refill    cyanocobalamin (VITAMIN B-12) 1000 MCG tablet Take 1,000 mcg by mouth daily      ergocalciferol (ERGOCALCIFEROL) 1 25 MG (82005 UT) capsule Take 50,000 Units by mouth      ferrous sulfate 325 (65 Fe) mg tablet Take 1 tablet by mouth daily with breakfast      hydroxychloroquine (PLAQUENIL) 200 mg tablet Take 200 mg by mouth 2 (two) times a day      ketorolac (TORADOL) 10 mg tablet Take 10 mg by mouth Three times daily as needed      rizatriptan (MAXALT) 10 MG tablet Take 10 mg by mouth      methylPREDNISolone 4 MG tablet therapy pack if needed   (Patient not taking: Reported on 3/31/2022 )       No current facility-administered medications for this visit       Current Outpatient Medications on File Prior to Visit   Medication Sig    cyanocobalamin (VITAMIN B-12) 1000 MCG tablet Take 1,000 mcg by mouth daily    ergocalciferol (ERGOCALCIFEROL) 1 25 MG (51570 UT) capsule Take 50,000 Units by mouth    ferrous sulfate 325 (65 Fe) mg tablet Take 1 tablet by mouth daily with breakfast    hydroxychloroquine (PLAQUENIL) 200 mg tablet Take 200 mg by mouth 2 (two) times a day    ketorolac (TORADOL) 10 mg tablet Take 10 mg by mouth Three times daily as needed    rizatriptan (MAXALT) 10 MG tablet Take 10 mg by mouth    methylPREDNISolone 4 MG tablet therapy pack if needed   (Patient not taking: Reported on 3/31/2022 )     No current facility-administered medications on file prior to visit  She has No Known Allergies            Objective:    Blood pressure 91/53, pulse 70, temperature (!) 97 1 °F (36 2 °C), height 5' 5" (1 651 m), weight 88 kg (194 lb 1 6 oz)  Physical Exam    Neurological Exam  CONSTITUTIONAL: NAD, pleasant  NECK: supple, no lymphadenopathy, no thyromegaly, no JVD  CARDIOVASCULAR: RRR, normal S1S2, no murmurs, no rubs  RESP: clear to auscultation bilaterally, no wheezes/rhonchi/rales  ABDOMEN: soft, non tender, non distended  SKIN: no rash or skin lesions  EXTREMITIES: no edema, pulses 2+bilaterally  PSYCH: appropriate mood and affect  NEUROLOGIC COMPREHENSIVE EXAM: Patient is oriented to person, place and time, NAD; appropriate affect  CN II, III, IV, V, VI, VII,VIII,IX,X,XI-XII intact with EOMI, PERRLA, OKN intact, VF grossly intact, fundi poorly visualized secondary to pupillary constriction; symmetric face noted  Motor: 5/5 UE/LE bilateral symmetric; Sensory: intact to light touch and pinprick bilaterally; normal vibration sensation feet bilaterally; Coordination within normal limits on FTN and INGRID testing; DTR: 2/4 through, no Babinski, no clonus  Tandem gait is intact  Romberg: absent  ROS:  12 points of review of system was reviewed with the patient and was unremarkable with exception: see HPI  Review of Systems    Constitutional: Negative  Negative for appetite change and fever  HENT: Negative    Negative for hearing loss, tinnitus, trouble swallowing and voice change  Eyes: Negative  Negative for photophobia and pain  Respiratory: Negative  Negative for shortness of breath  Cardiovascular: Negative  Negative for palpitations  Gastrointestinal: Negative  Negative for nausea and vomiting  Endocrine: Negative  Negative for cold intolerance  Genitourinary: Negative  Negative for dysuria, frequency and urgency  Musculoskeletal: Negative  Negative for myalgias and neck pain  Skin: Negative  Negative for rash  Neurological: Negative  Negative for dizziness, tremors, seizures, syncope, facial asymmetry, speech difficulty, weakness, light-headedness, numbness and headaches  Hematological: Negative  Does not bruise/bleed easily  Psychiatric/Behavioral: Negative  Negative for confusion, hallucinations and sleep disturbance     All other systems reviewed and are negative

## 2022-03-31 NOTE — PROGRESS NOTES
Patient ID: Moriah Sandoval is a 51 y o  female  Assessment/Plan:    No problem-specific Assessment & Plan notes found for this encounter  {Assess/PlanSmartLinks:15318}       Subjective:    HPI    {[facility] HPI texts:15762}    {Common ambulatory SmartLinks:13069}         Objective: There were no vitals taken for this visit  Physical Exam    Neurological Exam      ROS:    Review of Systems   Constitutional: Negative  Negative for appetite change and fever  HENT: Negative  Negative for hearing loss, tinnitus, trouble swallowing and voice change  Eyes: Negative  Negative for photophobia and pain  Respiratory: Negative  Negative for shortness of breath  Cardiovascular: Negative  Negative for palpitations  Gastrointestinal: Negative  Negative for nausea and vomiting  Endocrine: Negative  Negative for cold intolerance  Genitourinary: Negative  Negative for dysuria, frequency and urgency  Musculoskeletal: Negative  Negative for myalgias and neck pain  Skin: Negative  Negative for rash  Neurological: Negative  Negative for dizziness, tremors, seizures, syncope, facial asymmetry, speech difficulty, weakness, light-headedness, numbness and headaches  Hematological: Negative  Does not bruise/bleed easily  Psychiatric/Behavioral: Negative  Negative for confusion, hallucinations and sleep disturbance  All other systems reviewed and are negative

## 2022-04-05 ENCOUNTER — TELEPHONE (OUTPATIENT)
Dept: NEUROLOGY | Facility: CLINIC | Age: 47
End: 2022-04-05

## 2022-06-02 ENCOUNTER — TELEPHONE (OUTPATIENT)
Dept: NEUROLOGY | Facility: CLINIC | Age: 47
End: 2022-06-02

## 2022-06-02 NOTE — TELEPHONE ENCOUNTER
Called patient den offering her a sooner appointment on 6-8-22 at 1 pm with Goldy Andrews   Left call back number [phone]

## 2022-06-06 ENCOUNTER — TELEPHONE (OUTPATIENT)
Dept: BARIATRICS | Facility: CLINIC | Age: 47
End: 2022-06-06

## 2022-06-13 ENCOUNTER — TELEPHONE (OUTPATIENT)
Dept: BARIATRICS | Facility: CLINIC | Age: 47
End: 2022-06-13

## 2022-06-13 NOTE — TELEPHONE ENCOUNTER
PT CALLED TO BECOME A PT  PT HAS HX OF BARIATRIC SX  PT HAD THE SLEEVE DONE IN 2017, IN [address] [place] [address] Pkwy, WITH DR Tobi Gamez  PT WOULD LIKE TO ESTABLISH CARE W/ DR ESCOBAR  PT HAS REFLUX ISSUES AND INTERESTED IN REVISION  PTS PCP RECORDS ARE IN [facility] AND OP NOTE WILL BE FAXED

## 2022-06-15 DIAGNOSIS — Z98.84 BARIATRIC SURGERY STATUS: Primary | ICD-10-CM

## 2022-06-20 ENCOUNTER — TELEPHONE (OUTPATIENT)
Dept: NEUROLOGY | Facility: CLINIC | Age: 47
End: 2022-06-20

## 2022-06-20 NOTE — TELEPHONE ENCOUNTER
Called and left a voicemail for patient - Please call back to confirm upcoming appointment with PATIENTS [facility]  Provided patient with apt date, time and location  Informed patient that check in is at least 15 minutes prior to apt time

## 2022-07-22 ENCOUNTER — HOSPITAL ENCOUNTER (OUTPATIENT)
Dept: RADIOLOGY | Facility: HOSPITAL | Age: 47
Discharge: HOME | End: 2022-07-22
Attending: SURGERY
Payer: COMMERCIAL

## 2022-07-22 DIAGNOSIS — Z98.84 BARIATRIC SURGERY STATUS: ICD-10-CM

## 2022-07-22 PROCEDURE — 74240 X-RAY XM UPR GI TRC 1CNTRST: CPT

## 2022-08-25 ENCOUNTER — TELEPHONE (OUTPATIENT)
Dept: OTHER | Facility: OTHER | Age: 47
End: 2022-08-25

## 2022-08-26 ENCOUNTER — OFFICE VISIT (OUTPATIENT)
Dept: BARIATRICS | Facility: CLINIC | Age: 47
End: 2022-08-26
Payer: COMMERCIAL

## 2022-08-26 VITALS
WEIGHT: 204 LBS | HEIGHT: 65 IN | OXYGEN SATURATION: 98 % | BODY MASS INDEX: 33.99 KG/M2 | TEMPERATURE: 97.4 F | SYSTOLIC BLOOD PRESSURE: 98 MMHG | HEART RATE: 82 BPM | DIASTOLIC BLOOD PRESSURE: 68 MMHG

## 2022-08-26 DIAGNOSIS — Z98.84 BARIATRIC SURGERY STATUS: ICD-10-CM

## 2022-08-26 DIAGNOSIS — K21.9 GASTROESOPHAGEAL REFLUX DISEASE, UNSPECIFIED WHETHER ESOPHAGITIS PRESENT: Primary | ICD-10-CM

## 2022-08-26 PROCEDURE — 99204 OFFICE O/P NEW MOD 45 MIN: CPT | Performed by: SURGERY

## 2022-08-26 RX ORDER — LANOLIN ALCOHOL/MO/W.PET/CERES
1 CREAM (GRAM) TOPICAL DAILY
COMMUNITY
Start: 2022-07-05

## 2022-08-26 RX ORDER — PANTOPRAZOLE SODIUM 40 MG/1
40 TABLET, DELAYED RELEASE ORAL 2 TIMES DAILY
COMMUNITY
Start: 2022-08-02 | End: 2022-10-14 | Stop reason: SDUPTHER

## 2022-08-26 NOTE — PROGRESS NOTES
OFFICE VISIT - BARIATRIC SURGERY  Norma Day 49 y o  female MRN: [id number]  Unit/Bed#:  Encounter: [id number]      HPI:  Norma Day is a 49 y o  female status post laparoscopic sleeve gastrectomy in  in [place] with PEH repair at that time  5 months later in May 2018, she had lap marisa and re-do PEH repair  She moved to [facility] last year to work at [employer] and comes to the office today with complaints of daily reflux/gerd symptoms, heartburn etc      Subjective     At the time of their surgery the patient was 286 lbs, and was able to drop down as low as 166  Today, their weight is 34 5, with a BMI of 34 5  Tolerating diet: unable to tolerate most proteins, pain after eating, bloating  Main symptoms: heartburn throughout the day  Worse with food: yes  Nausea: yes  Vomiting: occasional  Diarrhea: last several months 3x/weekly  Duration of symptoms: at least 2 years  Smoking: no  Alcohol use: no  NSAID use: no  Caffeine use: occasional  Current treatment: protonix daily, tums as needed  Past medical history significant for: anem ia  Previous cholecystectomy or pertinent abdominal surgeries: open appendectomy, open right nephrectomy, laparoscopic sleeve gastrectomy, laparoscopic cholecystectomy, laparoscopic hiatel hernia repair,  x 2  Previous EGD: n/a  Previous Upper GI: yes, 22  Previous Manometry: n/a  Ambulation is not impaired       Review of Systems  Denies fevers, chills, n/v/d, chest pain, sob, headaches, blurred vision, dizziness    Historical Information   Past Medical History:   Diagnosis Date    Anemia      Past Surgical History:   Procedure Laterality Date    APPENDECTOMY       SECTION      CHOLECYSTECTOMY  2018    GASTRIC BYPASS  2017    NEPHRECTOMY       Social History   Social History     Substance and Sexual Activity   Alcohol Use Not Currently     Social History     Substance and Sexual Activity   Drug Use Never     Social History     Tobacco Use Smoking Status Former Smoker   Smokeless Tobacco Never Used       Objective       Current Vitals:   Blood Pressure: 98/68 (08/26/22 1231)  Pulse: 82 (08/26/22 1231)  Temperature: (!) 97 4 °F (36 3 °C) (08/26/22 1231)  Temp Source: Tympanic (08/26/22 1231)  Height: 5' 4 5" (163 8 cm) (08/26/22 1231)  Weight - Scale: 92 5 kg (204 lb) (08/26/22 1231)  SpO2: 98 % (08/26/22 1231)    Invasive Devices  Report    None                 Physical Exam:  Awake, alert, oriented, no acute distress  Normal work of breathing  Regular rate  Abd soft, obese, non tender, non distended, all scars well healed  Moves all extremities  Normal affect  No gross neuro deficits      Pathology, and Other Studies: I have personally reviewed pertinent reports  Assessment:    Abigail Warren is a 49 y o  female status post laparoscopic sleeve gastrectomy w/ PEH repair and subsequent lap marisa w/ re-do PEH repair in Dec 2017 and May 2018 in [address] [place] [address], now presenting w/ chief complaint of severe GERD/heartburn symptoms  Workup thus far reviewed and discussed with patient: UGI positive for abnormal findings  Workup includes:     UGI    Narrative & Impression   UPPER GI SERIES  SINGLE CONTRAST     INDICATION:  Z98 [id number]: Bariatric surgery status      COMPARISON:  None     IMAGES:  25     FLUOROSCOPY TIME:   2 24 minutes     TECHNIQUE:  The patient was given barium by mouth and images of the esophagus, stomach, and small bowel were obtained       FINDINGS:     The esophagus is normal in caliber  Esophageal motility is normal and emptying of contrast from the esophagus is prompt  No mucosal abnormalities although evaluation is limited with single contrast technique      Postsurgical changes of sleeve gastrectomy are present  No extraluminal contrast or obstruction  No gross gastric mucosal abnormalities although evaluation limited with single contrast technique    No penetrating ulcers or masses      Contrast empties promptly into the duodenum  The duodenum is normal in caliber  The ligament of Treitz/duodenojejunal junction lies in a normal position      Gastroesophageal reflux was observed extending to the upper esophagus      Moderate sized hiatal hernia is present         IMPRESSION:        1  Postsurgical changes of sleeve gastrectomy  No leak or obstruction      2  Moderate sized hiatal hernia with gastroesophageal reflux extending as high as the upper esophagus  EGD  n/a    MANOMETRY/pH Study  n/a      PLAN:  Schedule for EGD, manometry, and for 24 hr pH study  Hold protonix 7 days prior to pH study/manometry  Follow up after above completed                Luther Stinson MD  Bariatric Surgery Fellow  8/26/2022  1:05 PM

## 2022-08-26 NOTE — TELEPHONE ENCOUNTER
Patient called stating she had a message from the office asking her if she would like to come in at 15 or 1230, states she will be there at 1230 and is confirming  Thank you!

## 2022-09-13 ENCOUNTER — TELEPHONE (OUTPATIENT)
Dept: GASTROENTEROLOGY | Facility: HOSPITAL | Age: 47
End: 2022-09-13

## 2022-09-21 ENCOUNTER — HOSPITAL ENCOUNTER (OUTPATIENT)
Dept: GASTROENTEROLOGY | Facility: HOSPITAL | Age: 47
Discharge: HOME | End: 2022-09-21
Payer: COMMERCIAL

## 2022-09-21 VITALS
TEMPERATURE: 97.6 F | DIASTOLIC BLOOD PRESSURE: 60 MMHG | OXYGEN SATURATION: 100 % | HEART RATE: 75 BPM | RESPIRATION RATE: 16 BRPM | SYSTOLIC BLOOD PRESSURE: 98 MMHG

## 2022-09-21 DIAGNOSIS — K21.9 GASTROESOPHAGEAL REFLUX DISEASE, UNSPECIFIED WHETHER ESOPHAGITIS PRESENT: ICD-10-CM

## 2022-09-21 PROCEDURE — 91038 ESOPH IMPED FUNCT TEST > 1HR: CPT

## 2022-09-21 PROCEDURE — 91020 GASTRIC MOTILITY STUDIES: CPT

## 2022-09-21 NOTE — PERIOPERATIVE NURSING NOTE
Patient brought in the room and educated on procedure  Lidocaine 2% topical solution inserted via nostrils  Manometry catheter inserted via left nostril and secured  10 liquid swallows, 10 viscous swallow and 1 rapid swallow ,  And states she can not  perform the  1 rapid drink challenge with 200 cc of water performed  Patient Tolerated procedure  Catheter removed intact   PH probe inserted via left nostril and secured  Zephr recorder teachback performed and patient verbalized understanding  Patient instructed to return next day to have probe remove  Discharge instructions given and patient ambulated out of room in stable condition

## 2022-09-23 PROCEDURE — 91010 ESOPHAGUS MOTILITY STUDY: CPT | Performed by: INTERNAL MEDICINE

## 2022-09-23 PROCEDURE — 91038 ESOPH IMPED FUNCT TEST > 1HR: CPT | Performed by: INTERNAL MEDICINE

## 2022-10-14 DIAGNOSIS — Z98.84 BARIATRIC SURGERY STATUS: ICD-10-CM

## 2022-10-14 DIAGNOSIS — K21.9 GERD (GASTROESOPHAGEAL REFLUX DISEASE): Primary | ICD-10-CM

## 2022-10-14 RX ORDER — PANTOPRAZOLE SODIUM 40 MG/1
40 TABLET, DELAYED RELEASE ORAL 2 TIMES DAILY
Qty: 60 TABLET | Refills: 2 | Status: SHIPPED | OUTPATIENT
Start: 2022-10-14 | End: 2023-01-12

## 2022-10-20 ENCOUNTER — TELEPHONE (OUTPATIENT)
Dept: NEUROLOGY | Facility: CLINIC | Age: 47
End: 2022-10-20

## 2022-10-20 NOTE — TELEPHONE ENCOUNTER
Elidia Michaels  to Lowell Ennis MD    [place]      10:02 AM  I experienced auras, sharp pain, nausea, photosensitivity, dizziness, and balance issues  I took Tylenol, RIZATRIPTAN, and toradol  The following day I was still experiencing dizziness and balance issues  I would close my eyes for less than 10 minutes and was uncertain of time and place when I opened them  It was probably a second or two before I became aware  Symptoms we’re fully resolved by Wednesday

## 2022-10-20 NOTE — TELEPHONE ENCOUNTER
----- Message from Angelika Duran RN sent at 10/18/2022  8:07 AM EDT -----  Regarding: FW: Prescription    ----- Message -----  From: Tariq Grider  Sent: 10/18/2022   6:26 AM EDT  To: [facility]  Subject: Prescription                                     I suffered a complex migraine Sunday night and I’m still feeling the effects this morning  My current migraine prescriptions didn’t help  Is there any other prescription that we could try?

## 2022-10-20 NOTE — TELEPHONE ENCOUNTER
CHAUNCEY Car  You; Ulysses Brandt MD 53 minutes ago (1:50 PM)         I would recommend follow up to discuss as there are other options but last I spoke with her some time ago migraines were always taken care of with toradol/maxalt so I would want to see how often this has been happening in the past 6 months to determine if we need better prevention or just abortive treatment    Butch Leblanc

## 2022-10-20 NOTE — TELEPHONE ENCOUNTER
Need more information  Left detailed message (okay per communication consent) advising more information is needed on symptoms and medications  MyChart message sent to the pt

## 2022-10-24 ENCOUNTER — PREP FOR PROCEDURE (OUTPATIENT)
Dept: BARIATRICS | Facility: CLINIC | Age: 47
End: 2022-10-24

## 2022-10-24 DIAGNOSIS — Z98.84 BARIATRIC SURGERY STATUS: Primary | ICD-10-CM

## 2022-11-18 ENCOUNTER — TELEPHONE (OUTPATIENT)
Dept: NEUROLOGY | Facility: CLINIC | Age: 47
End: 2022-11-18

## 2022-11-18 NOTE — TELEPHONE ENCOUNTER
Patient submitted request thru [facility] to schedule with Silvia Farris with the note " to reschedule todays appointment"  Called patient and left message to return call to the office to verify if patient is coming to appointment today since it is not cancelled and reschedule if needed

## 2022-11-18 NOTE — TELEPHONE ENCOUNTER
Appointment Request From: Alley Boone     With Provider: CHAUNCEY [facility]     Preferred Date Range: 11/23/2022 - 11/23/2022     Preferred Times: Any Time     Reason for visit: Neurology Office Visit     Comments:  Reschedule today’s appointment

## 2022-11-24 DIAGNOSIS — K21.9 GERD (GASTROESOPHAGEAL REFLUX DISEASE): ICD-10-CM

## 2022-11-24 DIAGNOSIS — Z98.84 BARIATRIC SURGERY STATUS: ICD-10-CM

## 2022-12-02 ENCOUNTER — TELEPHONE (OUTPATIENT)
Dept: NEUROLOGY | Facility: CLINIC | Age: 47
End: 2022-12-02

## 2022-12-02 NOTE — TELEPHONE ENCOUNTER
Cipriano Nascimento  Patient Appointment Schedule Request Pool 2 hours ago (10:29 AM)     [place]  Appointment Request From: Cipriano Nascimento     With Provider: CHAUNCEY [facility]]     Preferred Date Range: 12/14/2022 - 12/14/2022     Preferred Times: Any Time     Reason for visit: Neurology Office Visit     Comments:  Complex migraine

## 2022-12-02 NOTE — TELEPHONE ENCOUNTER
Called patient and left message to return call to the office to schedule appointment with Yesenia Wilson

## 2022-12-05 RX ORDER — PANTOPRAZOLE SODIUM 40 MG/1
TABLET, DELAYED RELEASE ORAL
Qty: 180 TABLET | Refills: 1 | Status: SHIPPED | OUTPATIENT
Start: 2022-12-05

## 2022-12-07 ENCOUNTER — HOSPITAL ENCOUNTER (OUTPATIENT)
Dept: GASTROENTEROLOGY | Facility: HOSPITAL | Age: 47
Setting detail: OUTPATIENT SURGERY
Discharge: HOME | End: 2022-12-07
Attending: SURGERY

## 2022-12-07 VITALS
SYSTOLIC BLOOD PRESSURE: 110 MMHG | RESPIRATION RATE: 18 BRPM | BODY MASS INDEX: 33.99 KG/M2 | TEMPERATURE: 98.3 F | HEIGHT: 65 IN | OXYGEN SATURATION: 100 % | DIASTOLIC BLOOD PRESSURE: 79 MMHG | WEIGHT: 204 LBS | HEART RATE: 62 BPM

## 2022-12-07 DIAGNOSIS — Z98.84 BARIATRIC SURGERY STATUS: ICD-10-CM

## 2022-12-07 PROBLEM — M35.9 CONNECTIVE TISSUE DISEASE (HCC): Status: ACTIVE | Noted: 2022-12-07

## 2022-12-07 LAB
EXT PREGNANCY TEST URINE: NEGATIVE
EXT. CONTROL: NORMAL

## 2022-12-07 RX ORDER — SODIUM CHLORIDE 9 MG/ML
125 INJECTION, SOLUTION INTRAVENOUS CONTINUOUS
Status: DISCONTINUED | OUTPATIENT
Start: 2022-12-07 | End: 2022-12-11 | Stop reason: HOSPADM

## 2022-12-07 RX ADMIN — SODIUM CHLORIDE 125 ML/HR: 0.9 INJECTION, SOLUTION INTRAVENOUS at 10:53

## 2022-12-07 NOTE — H&P
H&P EXAM - Outpatient Endoscopy  [facility] [address] [facility] GI LAB INTRA   Adriano Leslie 49 y o  female MRN: [id number]  Unit/Bed#:  Encounter: [id number]        Impression: Heartburn status post laparoscopic sleeve gastrectomy and paraesophageal hernia repair    Plan:Upper endoscopy     Chief Complaint: Heartburn    Physical Exam: Normal not in acute distress   Chest: Clear to auscultation   Heart: Normal S1 and S2

## 2022-12-12 ENCOUNTER — TELEPHONE (OUTPATIENT)
Dept: NEUROLOGY | Facility: CLINIC | Age: 47
End: 2022-12-12

## 2022-12-12 NOTE — TELEPHONE ENCOUNTER
ADD ON APPT  Pt called to r/s missed f/u with Edwin Griffin  New appt is now 12/14 @ 1:45 in [place] with Edwin Griffin

## 2022-12-14 ENCOUNTER — OFFICE VISIT (OUTPATIENT)
Dept: NEUROLOGY | Facility: CLINIC | Age: 47
End: 2022-12-14

## 2022-12-14 VITALS
SYSTOLIC BLOOD PRESSURE: 114 MMHG | WEIGHT: 218 LBS | DIASTOLIC BLOOD PRESSURE: 64 MMHG | TEMPERATURE: 96.4 F | RESPIRATION RATE: 20 BRPM | BODY MASS INDEX: 36.32 KG/M2 | HEIGHT: 65 IN | HEART RATE: 80 BPM

## 2022-12-14 DIAGNOSIS — G43.009 MIGRAINE WITHOUT AURA AND WITHOUT STATUS MIGRAINOSUS, NOT INTRACTABLE: Primary | ICD-10-CM

## 2022-12-14 RX ORDER — NARATRIPTAN 2.5 MG/1
2.5 TABLET ORAL AS NEEDED
Qty: 9 TABLET | Refills: 2 | Status: SHIPPED | OUTPATIENT
Start: 2022-12-14

## 2022-12-14 RX ORDER — TOPIRAMATE 25 MG/1
TABLET ORAL
Qty: 60 TABLET | Refills: 3 | Status: SHIPPED | OUTPATIENT
Start: 2022-12-14

## 2022-12-14 NOTE — PROGRESS NOTES
Patient ID: Devora Flaherty is a 49 y o  female  Assessment/Plan:    We discussed preventative vs acute management of migraine  With current frequency of 6-8 headache days per month we have decided to use preventative management  Discussed options- would avoid beta blockers with her history of WPW and lower bps; she would prefer to avoid antidepressant therapy; she is agreeable to topamax or potential use of nurtec (CGRP inhibitors) in the future  Topamax started and will be titrated to lower dose; we did discuss importance of regular hydration especially as she has a solitary kidney  Repeat CMP after 1 month on medication  For acute management we will trial a different triptan that hopefully will not give her as many side effects, amerge ordered  Diagnoses and all orders for this visit:    Migraine without aura and without status migrainosus, not intractable  -     Comprehensive metabolic panel; Future  -     topiramate (Topamax) 25 mg tablet; 1 tablet nightly for one week and then increase to 2 tablets nightly  -     naratriptan (AMERGE) 2 5 MG tablet; Take 1 tablet (2 5 mg total) by mouth as needed for migraine 2 5 mg at onset of headache, may repeat in 4 hours if needed         Subjective:  Devora Flaherty presents today for migraine follow up  She was seen previously for intermittent right sided symptoms; she had workup and no demyelinating or vascular cause for these episodes  She states she had one shorter lasting episode with a recent migraine she had right facial abnormal sensation  She states her headaches have increased in frequency since last seen- usually 1 headache/week, sometimes can last half a day/other times it can last 2-3 days  She has missed work because of her headaches  She states headache is primarily right sided, unchanged from previous migraines in location/sensation  She is unsure why the increase in headaches but she has been having more trouble sleeping lately   She has tried melatonin/otc sleep aide without improvement  She states she does sit in front of a computer a lot which may also be a trigger  She denies red flag headache symptoms  She mentions she has been on topamax in the past and tolerated this medication well  There has been no other change to her lifestyle  She did have a recent eye exam and states this was normal-she continues with plaquenil for UCTD  She states the maxalt doesn't work as well as it used to and it causes her side effect- balance is off/doesnt feel well  The only other acute option she has tried is imitrex  HPI   Since your last visit are your headaches Worsened  If better or worse, please explain: More severe  Are you taking your current medications as prescribed? yes  Do you have any side effects? yes - "Maxalt- feels off"  How often do you use abortive medications to treat a headache? 3 times per month  How effective are they? somewhat effective  From 0-10, how severe is the pain for a typical headache for you? 8  What does your typical headache pain feel like? sharp  Where is your typical headache? right-sided unilateral  What is your current headache frequency: 1 times per week  How long does your typical headache last? 2-3 day(s)  In addition to the head pain, what other symptoms do you have before or during your headaches? nausea, vomiting, brain fog and Balanced off, speech difficulty  Do you have anything you need to discuss with the doctor during your visit?  No    Previous history:  She has a medical history/surgical history of iron deficiency (being treated po- had infusions in past), elevated AMAURY (1:640; repeat 1:320) diagnosed by rheumatology with UCTD and on plaquenil now, gastric sleeve surgery in 2017- lost 100 lbs and kept it off since then- on vitamins, migraine for many years which never needed prevention and has been stable at 1x every couple months treated effectively in 2 hours by maxalt or toradol prn, rosey goodrich s/p ablation without repeat episodes, 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, c sections  She does not use tobacco, alcohol, or drugs  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 from [employer] 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); she does do 24 hour shifts at times; she is also busy with PA school  She states she hydrates well with water; has regular diet/appetite, and is using bathroom without difficulty  She denies any significant sleep difficulty  She does keep active by doing hikes/walks  The following portions of the patient's history were reviewed and updated as appropriate: allergies, current medications, past family history, past medical history, past social history, past surgical history and problem list          Objective:    Blood pressure 114/64, pulse 80, temperature (!) 96 4 °F (35 8 °C), temperature source Temporal, resp  rate 20, height 5' 5" (1 651 m), weight 98 9 kg (218 lb), last menstrual period 11/21/2022  Physical Exam  Vitals reviewed  Constitutional:       General: She is not in acute distress  Appearance: She is obese  HENT:      Head: Normocephalic and atraumatic  Right Ear: External ear normal       Left Ear: External ear normal       Nose: Nose normal       Mouth/Throat:      Mouth: Mucous membranes are moist       Pharynx: Oropharynx is clear  Eyes:      General: Lids are normal          Right eye: No discharge  Left eye: No discharge  Extraocular Movements: Extraocular movements intact  Pupils: Pupils are equal, round, and reactive to light  Cardiovascular:      Rate and Rhythm: Normal rate and regular rhythm  Pulses: Normal pulses  Heart sounds: Normal heart sounds  Pulmonary:      Effort: Pulmonary effort is normal       Breath sounds: Normal breath sounds  Abdominal:      General: There is no distension  Musculoskeletal:      Cervical back: Normal range of motion  No tenderness  Right lower leg: No edema        Left lower leg: No edema    Skin:     General: Skin is warm  Capillary Refill: Capillary refill takes less than 2 seconds  Findings: No rash  Neurological:      General: No focal deficit present  Mental Status: She is alert  Motor: Motor strength is normal       Coordination: Romberg sign negative  Psychiatric:         Mood and Affect: Mood normal          Speech: Speech normal          Behavior: Behavior normal          Neurological Exam  Mental Status  Alert  Oriented to person, place and time  Speech is normal  Language is fluent with no aphasia  Cranial Nerves  CN II: Visual acuity is normal  Visual fields full to confrontation  CN III, IV, VI: Extraocular movements intact bilaterally  Normal lids and orbits bilaterally  Pupils equal round and reactive to light bilaterally  CN V: Facial sensation is normal   CN VII: Full and symmetric facial movement  CN VIII: Hearing is normal   CN IX, X: Palate elevates symmetrically  Normal gag reflex  CN XI: Shoulder shrug strength is normal   CN XII: Tongue midline without atrophy or fasciculations  Motor  Normal muscle bulk throughout  Strength is 5/5 throughout all four extremities  Sensory  Light touch is normal in upper and lower extremities  Coordination  Right: Rapid alternating movement normal Left: Rapid alternating movement normal     Gait  Casual gait is normal including stance, stride, and arm swing  Romberg is absent  Able to rise from chair without using arms  ROS:    Review of Systems   Constitutional: Negative  Negative for appetite change and fever  HENT: Negative  Negative for hearing loss, tinnitus, trouble swallowing and voice change  Eyes: Negative  Negative for photophobia, pain and visual disturbance  Respiratory: Negative  Negative for shortness of breath  Cardiovascular: Negative  Negative for palpitations  Gastrointestinal: Negative  Negative for nausea and vomiting  Endocrine: Negative    Negative for cold intolerance  Genitourinary: Negative  Negative for dysuria, frequency and urgency  Musculoskeletal: Negative  Negative for gait problem, myalgias and neck pain  Skin: Negative  Negative for rash  Allergic/Immunologic: Negative  Neurological: Positive for numbness and headaches  Negative for dizziness, tremors, seizures, syncope, facial asymmetry, speech difficulty, weakness and light-headedness  Hematological: Negative  Does not bruise/bleed easily  Psychiatric/Behavioral: Positive for sleep disturbance  Negative for confusion and hallucinations     ROS was reviewed and updated as appropriate

## 2022-12-14 NOTE — PATIENT INSTRUCTIONS
Topamax for migraine prevention and amerge for acute management  Let me know of side effects  Hydrate well - 80oz/fluids/day  Look up nerivio-for potential use in future  Continue with efforts to get better sleep  Follow up in 4 months time  Migraine Headache   AMBULATORY CARE:   A migraine headache  is a severe headache  The pain can be so severe that it interferes with your daily activities  A migraine can last a few hours up to several days  The exact cause of migraines is not known  A family history of migraines increases your risk  Your risk is also higher if you are a woman or take medicines such as estrogen or a vasodilator  Common warning signs include the following:  Warning signs usually start 15 to 60 minutes before the headache:  Visual changes (auras), such as blurred vision, temporary blind or bright spots, lines, or hallucinations    Unusual tiredness or frequent yawning    Tingling in an arm or leg    Signs and symptoms of a migraine headache:  A migraine headache usually begins as a dull ache around the eye or temple  The pain may get worse with movement  You may also have the following:  Pain in your head that may increase to the point that you cannot do everyday activities    Pain on one or both sides of your head    Throbbing, pulsing, or pounding pain in your head    Nausea and vomiting    Sensitivity to light, noise, or smells    Call your local emergency number (911 in the [address] [place] [address],[address]) or have someone call if:   You feel like you are going to faint, you become confused, or you have a seizure  Seek care immediately if:   You have a headache that seems different or much worse than your usual migraine headache  You have a severe headache with a fever or a stiff neck  You have new problems with speech, vision, balance, or movement  Call your doctor or neurologist if:   You have a fever  Your migraines interfere with your daily activities      Your medicines or treatments stop working  You have questions about your condition or care  Treatment:  Migraines cannot be cured  The goal of treatment is to reduce your symptoms  Take medicine as soon as you feel a migraine begin, or as directed  The following may be used to manage migraines:  Medicines  may be given to prevent or treat migraines  Take medicine to prevent migraines as soon as you feel a migraine begin, or as directed  Your healthcare provider may recommend any of the following:    Migraine medicines  are used to help prevent or stop a migraine  NSAIDs  help decrease swelling and pain or fever  This medicine is available with or without a doctor's order  NSAIDs can cause stomach bleeding or kidney problems in certain people  If you take blood thinner medicine, always ask your healthcare provider if NSAIDs are safe for you  Always read the medicine label and follow directions  Acetaminophen  decreases pain and fever  It is available without a doctor's order  Ask how much to take and how often to take it  Follow directions  Read the labels of all other medicines you are using to see if they also contain acetaminophen, or ask your doctor or pharmacist  Acetaminophen can cause liver damage if not taken correctly  Do not use more than 4 grams (4,000 milligrams) total of acetaminophen in one day  Prescription pain medicine  may be given  Ask your healthcare provider how to take this medicine safely  Some prescription pain medicines contain acetaminophen  Do not take other medicines that contain acetaminophen without talking to your healthcare provider  Too much acetaminophen may cause liver damage  Prescription pain medicine may cause constipation  Ask your healthcare provider how to prevent or treat constipation  Antinausea medicine  may be given to calm your stomach and to help prevent vomiting  This medicine can also help relieve pain      Cognitive behavior therapy (CBT)  can help you learn ways to manage and prevent migraines  A therapist can teach you to relax and to reduce stress  You may learn ways to create healthy nutrition, activity, and sleep habits to prevent migraines  The therapist can also help you manage conditions that can affect migraines, such as anxiety or depression  Common triggers for a migraine include the following:   Stress, eye strain, oversleeping, or not getting enough sleep    Hormone changes in women from birth control pills, pregnancy, menopause, or during a monthly period    Skipping meals, going too long without eating, or not drinking enough liquids    Certain foods or drinks such as chocolate, hard cheese, alcohol, or drinks that contain caffeine    Foods that contain gluten, nitrates, MSG, or artificial sweeteners    Sunlight, bright or flashing lights, loud noises, smoke, or strong smells    Heat, humidity, or changes in the weather    Manage your symptoms:   Rest in a dark, quiet room  This will help decrease your pain  Sleep may also help relieve the pain  Apply ice to decrease pain  Use an ice pack, or put crushed ice in a plastic bag  Cover the ice pack with a towel and place it on your head where it hurts for 15 to 20 minutes every hour  Apply heat to decrease pain and muscle spasms  Use a small towel dampened with warm water or a heating pad, or sit in a warm bath  Apply heat on the area for 20 to 30 minutes every 2 hours  You may alternate heat and ice  Keep a migraine record  Write down when your migraines start and stop  Include your symptoms and what you were doing when a migraine began  Record what you ate or drank for 24 hours before the migraine started  Keep track of what you did to treat your migraine and if it worked  Prevent another migraine headache:   Prevent a medicine overuse headache  Take pain medicines only as long as directed  A medicine may be limited to a certain amount each month   Your healthcare provider can help you create a plan so you get a safe amount each month  Do not smoke  Nicotine and other chemicals in cigarettes and cigars can trigger a migraine and also cause lung damage  Ask your healthcare provider for information if you currently smoke and need help to quit  E-cigarettes or smokeless tobacco still contain nicotine  Talk to your healthcare provider before you use these products  Do not drink alcohol  Alcohol can trigger a migraine  It can also interfere with the medicines used to treat your migraine  Be physically active  Physical activity, such as exercise, may help prevent migraines  Talk to your healthcare provider about the best activity plan for you  Try to get at least 30 minutes of physical activity on most days  Manage stress  Stress may trigger a migraine  Learn new ways to relax, such as deep breathing  Follow a sleep schedule  Go to bed and get up at the same time each day  Eat a variety of healthy foods  Healthy foods include fruit, vegetables, whole-grain breads, low-fat dairy products, beans, lean meats, and fish  Do not have foods or drinks that trigger your migraines  Drink more liquids to prevent dehydration  Your healthcare provider can tell you how much liquid to drink each day and which liquids are best for you  Follow up with your doctor or neurologist as directed:  Bring your migraine record with you  Write down your questions so you remember to ask them during your visits  © Copyright [employer] 2022 Information is for End User's use only and may not be sold, redistributed or otherwise used for commercial purposes  All illustrations and images included in CareNotes® are the copyrighted property of A D A M , Inc  or Ana María Dye   The above information is an  only  It is not intended as medical advice for individual conditions or treatments   Talk to your doctor, nurse or pharmacist before following any medical regimen to see if it is safe and effective for you

## 2022-12-22 ENCOUNTER — OFFICE VISIT (OUTPATIENT)
Dept: BARIATRICS | Facility: CLINIC | Age: 47
End: 2022-12-22

## 2022-12-22 VITALS
WEIGHT: 213.5 LBS | DIASTOLIC BLOOD PRESSURE: 62 MMHG | SYSTOLIC BLOOD PRESSURE: 110 MMHG | BODY MASS INDEX: 35.57 KG/M2 | HEIGHT: 65 IN | HEART RATE: 87 BPM | TEMPERATURE: 95.7 F

## 2022-12-22 DIAGNOSIS — K21.00 GASTROESOPHAGEAL REFLUX DISEASE WITH ESOPHAGITIS WITHOUT HEMORRHAGE: Primary | ICD-10-CM

## 2022-12-22 DIAGNOSIS — Z98.84 BARIATRIC SURGERY STATUS: ICD-10-CM

## 2022-12-22 NOTE — PROGRESS NOTES
OFFICE VISIT - BARIATRIC SURGERY  Flores Messina 49 y o  female MRN: [id number]  Unit/Bed#:  Encounter: [id number]      HPI:  Flores Messina is a 49 y o  female status post laparoscopic sleeve gastrectomy in  in [address] [place] [address] with Parmova 23 repair at that time  5 months later in May 2018, she had lap marisa and re-do PEH repair  She moved to [place] last year to work at [email or web address] and comes to the office today with complaints of daily reflux/gerd symptoms, heartburn etc      Takes protonix BID and tums PRN    Subjective     At the time of their surgery the patient was 286 lbs, and was able to drop down as low as 166  Today, their weight is 213, with a BMI of 36 08  Tolerating diet: unable to tolerate most proteins, pain after eating, bloating  Main symptoms: heartburn throughout the day, worse at night  Worse with food: yes  Nausea: yes  Vomiting: occasional  Diarrhea: last several months 3x/weekly  Duration of symptoms: at least 2 years  Smoking: no  Alcohol use: no  NSAID use: no  Caffeine use: occasional  Current treatment: protonix BID, tums as needed  Past medical history significant for: anem ia  Previous cholecystectomy or pertinent abdominal surgeries: open appendectomy, open right nephrectomy, laparoscopic sleeve gastrectomy, laparoscopic cholecystectomy, laparoscopic hiatal hernia repair,  x 2  Previous EGD: n/a  Previous Upper GI: yes, 22  Previous Manometry: n/a  Ambulation is not impaired       Review of Systems  Denies fevers, chills, n/v/d, chest pain, sob, headaches, blurred vision, dizziness    Historical Information   Past Medical History:   Diagnosis Date   • Anemia      Past Surgical History:   Procedure Laterality Date   • APPENDECTOMY     •  SECTION     • CHOLECYSTECTOMY     • GASTRIC BYPASS  2017   • NEPHRECTOMY       Social History   Social History     Substance and Sexual Activity   Alcohol Use Not Currently     Social History     Substance and Sexual Activity Drug Use Never     Social History     Tobacco Use   Smoking Status Former   Smokeless Tobacco Never       Objective       Current Vitals:   Blood Pressure: 110/62 (12/22/22 0942)  Pulse: 87 (12/22/22 0942)  Temperature: (!) 95 7 °F (35 4 °C) (12/22/22 0942)  Temp Source: Tympanic (12/22/22 0942)  Height: 5' 4 5" (163 8 cm) (12/22/22 0942)  Weight - Scale: 96 8 kg (213 lb 8 oz) (12/22/22 0942)    Invasive Devices     None                 Physical Exam:  Awake, alert, oriented, no acute distress  Normal work of breathing  Regular rate  Abd soft, obese, non tender, non distended, all scars well healed  Moves all extremities  Normal affect  No gross neuro deficits      Pathology, and Other Studies: I have personally reviewed pertinent reports  Assessment:    Shea Padron is a 49 y o  female status post laparoscopic sleeve gastrectomy w/ PEH repair and subsequent lap marisa w/ re-do PEH repair in Dec 2017 and May 2018 in [address] [place] [address], now presenting w/ chief complaint of severe GERD/heartburn symptoms  Workup thus far reviewed and discussed with patient: UGI positive for abnormal findings  Workup includes:     UGI  7/22/22  FINDINGS:     The esophagus is normal in caliber  Esophageal motility is normal and emptying of contrast from the esophagus is prompt  No mucosal abnormalities although evaluation is limited with single contrast technique      Postsurgical changes of sleeve gastrectomy are present  No extraluminal contrast or obstruction  No gross gastric mucosal abnormalities although evaluation limited with single contrast technique  No penetrating ulcers or masses      Contrast empties promptly into the duodenum  The duodenum is normal in caliber  The ligament of Treitz/duodenojejunal junction lies in a normal position      Gastroesophageal reflux was observed extending to the upper esophagus      Moderate sized hiatal hernia is present         IMPRESSION:      1    Postsurgical changes of sleeve gastrectomy  No leak or obstruction      2  Moderate sized hiatal hernia with gastroesophageal reflux extending as high as the upper esophagus  EGD  DATE OF SERVICE:  12/07/22     PHYSICIAN(S):  Attending:   Sheri Lui MD      Fellow:   No Staff Documented         INDICATION:  Bariatric surgery status     POST-OP DIAGNOSIS:  See the impression below      PREPROCEDURE:  Informed consent was obtained for the procedure, including sedation  Risks of perforation, hemorrhage, adverse drug reaction and aspiration were discussed  The patient was placed in the left lateral decubitus position      Patient was explained about the risks and benefits of the procedure  Risks including but not limited to bleeding, infection, and perforation were explained in detail  Also explained about less than 100% sensitivity with the exam and other alternatives      DETAILS OF PROCEDURE:  Patient was taken to the procedure room where a time out was performed to confirm correct patient and correct procedure  The patient underwent monitored anesthesia care, which was administered by an anesthesia professional  The patient's blood pressure, heart rate, level of consciousness, respirations and oxygen were monitored throughout the procedure  The scope was advanced to the second part of the duodenum  Retroflexion was performed in the fundus  The patient experienced no blood loss  The procedure was not difficult  The patient tolerated the procedure well  There were no apparent complications       ANESTHESIA INFORMATION:  ASA: II  Anesthesia Type: IV Sedation with Anesthesia     MEDICATIONS:  No administrations occurring from 1146 to 1148 on 12/07/22         FINDINGS:  • Previous sleeve gastrectomy   Status post sleeve gastrectomy and paraesophageal hernia repair x2  Significant bile reflux into the esophagus  Small retained fundus seen on retroflex view  • 2 cm sliding hiatal hernia (type I hiatal hernia) - GE junction 34 cm from the incisors, diaphragmatic impression 36 cm from the incisors  • Performed single forceps biopsy to rule out H  pylori in the antrum  • Normal  • Abnormal mucosa in the lower third of the esophagus  Mild esophagitis LA grade A  • The stomach and duodenum appeared normal         SPECIMENS:  * No specimens in log *        IMPRESSION:  Sliding hiatal hernia status post sleeve gastrectomy  Mild esophagitis  Small retained fundus  Significant bile reflux    Lab Results   Component Value Date    FINALDX  12/07/2022     A  Stomach, Biopsy:  - Reactive antral gastritis/gastropathy   - Negative for intestinal metaplasia or dysplasia  - No Helicobacter pylori is identified on H&E stained slides  MANOMETRY/pH Study    9/21/22  Indication- GERD     Esophageal manometry  Esophageal motility- 10/10 swallows demonstrate normal esophageal contractibility pattern with mean DCI of 762 mmHg  s cm     LES- median IRP is within normal limits     Impedance- 90 percent complete clearance of liquid bolus swallows        [place] classification- normal esophageal motility with likely moderate size hiatal hernia     24 hour pH study-      Off PPI for 7 days     Acid exposure time is 2 2 percent in the upright position  Acid exposure is 0 percent recumbent position  Acid exposure time is 1 3 percent overall     DeMeester score is 5 1        46 episodes of reflux overall  44 episodes upright position- 13 episodes of acid reflux, 29 episode of weakly acid reflux, 2 episodes of weakly alkaline reflux  2 episodes in recumbent position- 1 episode of acid reflux, 1 episode of weakly acid        Symptom correlation was significant reported symptoms of heartburn while other reported symptoms are not significant        There was evidence of upright reflux within physiological range    PLAN:  3 month medical weight loss  Enroll level 2 revision pathway    Review op note available in Media tab            Hernan Gonzalez MD  Bariatric Surgery Fellow  12/22/2022  9:54 AM

## 2023-02-07 DIAGNOSIS — G43.009 MIGRAINE WITHOUT AURA AND WITHOUT STATUS MIGRAINOSUS, NOT INTRACTABLE: ICD-10-CM

## 2023-02-07 RX ORDER — TOPIRAMATE 25 MG/1
TABLET ORAL
Qty: 180 TABLET | Refills: 2 | Status: SHIPPED | OUTPATIENT
Start: 2023-02-07

## 2023-03-13 ENCOUNTER — TELEPHONE (OUTPATIENT)
Dept: BARIATRICS | Facility: CLINIC | Age: 48
End: 2023-03-13

## 2023-04-03 ENCOUNTER — TELEPHONE (OUTPATIENT)
Dept: NEUROLOGY | Facility: CLINIC | Age: 48
End: 2023-04-03

## 2023-04-03 NOTE — TELEPHONE ENCOUNTER
Called and left a voicemail for patient - Please call back to confirm upcoming appointment with [facility]  Provided patient with apt date, time and location  Informed patient that check in is at least 15 minutes prior to apt time

## 2023-04-22 DIAGNOSIS — Z98.84 BARIATRIC SURGERY STATUS: ICD-10-CM

## 2023-04-22 DIAGNOSIS — K21.9 GERD (GASTROESOPHAGEAL REFLUX DISEASE): ICD-10-CM

## 2023-04-26 RX ORDER — PANTOPRAZOLE SODIUM 40 MG/1
40 TABLET, DELAYED RELEASE ORAL 2 TIMES DAILY
Qty: 180 TABLET | Refills: 0 | Status: SHIPPED | OUTPATIENT
Start: 2023-04-26

## 2023-05-15 NOTE — PROGRESS NOTES
Bariatric Behavioral Health Evaluation    Level 2 revision pathway (three months)    Presenting Problem:     Post laparoscopic sleeve gastrectomy in 2017 in [address] [place] [address] with Parmova 23 repair at that time  5 months later in May 2018, she had lap marisa and re-do PEH repair  She moved to [place] last year to work at [employer]   At the time of their surgery the patient was 286 lbs, and was able to drop down as low as 166  Is the patient seeking Revision of  Bariatric Surgery? YES       Realizes Post- Op Requirements? 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 No      Drug and/or Alcohol use and treatment history: None /  None since surgery     Have you had any Substance Use Treatment?   No     Tobacco/Vaping History: former      Domestic Violence: No       Abuse or Trauma History: Yes  (Adolsence abuse)      Risk Assessment    Identified support system intact  Risk of harm to self or others:  none noted during evaluation  No HI/SI      Presence of Audio/Visual Hallucinations: Not reported during evaluation     Access to weapons: Not reported during evaluation     Observation: this interview only (SW and RD will follow Charlie Retort through the bariatric program)     Based on the previous information, the client presents the following risk of harm to self or others:  Low risk        Physical/Mental Health Status:              Appearance: appropriate           Sociability: average           Affect: appropriate           Mood: calm           Thought Process: coherent           Speech: normal           Content: no impairment           Orientation: person  Yes , place  Yes , time  Yes , normal attention span  Yes , normal memory  Yes   and normal judgement  Yes            Insight: emotional  good       BARIATRIC SURGERY EDUCATION CHECKLIST    Patient has received the following education related to the bariatric surgery process and understands:    Patients may be required to complete a psychiatric evaluation and receive clearance for surgery from mental health provider  Patients who undergo weight loss surgery are at higher risk of increased mental health concerns and suicide attempts  Patients may be required to complete a full substance abuse evaluation and then complete all treatment recommendations prior to surgery  If diagnosis of abuse/dependence results, patient may be required to remain sober for one (1) year before having bariatric surgery  Patients on psychiatric medications should check with their provider to discuss psychiatric medications and the changes in absorption  Patient should discuss all time release medications with provider and take all medications as prescribed      The recommendation is that there is no use of any tobacco products, Hookah or vapes for the bariatric post-operation patient  Bariatric surgery patients should not consume alcohol as a post-operative patient as it may increase risk of numerous health conditions including but not limited to alcohol abuse and ulcers  There is a possibility of weight regain if patient does not follow all program guidelines and recommendations  Bariatric surgery patients should exercise thirty (30) to sixty (60) minutes per day to maintain post-surgical weight loss  Research indicates that bariatric patients are more successful when they see a therapist for up to two (2) years post-op  Patients will follow all medical and dietary recommendations provided  Patient will keep all scheduled appointments and follow up with their physician for a minimum of five (5) years  Patient will take all vitamins as recommended  Post-operative vitamins are life-long  There is a goal month set  All requirements should be met by this time  Don't wait to get started! There is a deadline month set  All requirements must be finished by this time and if not, the patient will be halted in the surgery process  The patient can be referred to the medical weight management program or can come back to the surgical program once the unfinished tasks from the previous program are completed  Female patients of childbearing years are informed that pregnancy is not recommended until 12 - 18 months post-op  Recommendations: Recommended for surgery  yes  3 month medical weight loss  Enroll level 2 revision pathway  Social Service Note:  Patient presented for behavioral health evaluation for the bariatric program    Positive for Mental Health with diagnosis of PTSD  Current talk therapy  Denied history of Psychiatry  No reported history of Drug and/or Alcohol abuse or treatment  No tobacco use  Patient educated regarding the impact of nicotine and alcohol on the post surgery bariatric patient   Patient has a negative  family history of tobacco and alcohol addiction  Patient meets criteria for surgery at this time and is referred to the bariatric surgeon          Filipe Rutherford LCSW

## 2023-05-16 NOTE — PROGRESS NOTES
Bariatric Nutrition Assessment Note  PLAN:  3 month medical weight loss  Enroll level 2 revision pathway  Type of surgery    Vertical sleeve gastrectomy at [facility] in [place], [place]  Surgery Date: 2017  5 5 years post-op  Surgeon: Dr Fiona Escobedo is now established with Dr Marquise Pitts  51 y o   female   Wt 94 4 kg (208 lb 1 6 oz)   BMI 35 17 kg/m²    Wt Readings from Last 3 Encounters:   23 94 4 kg (208 lb 1 6 oz)   23 92 5 kg (204 lb)   22 96 8 kg (213 lb 8 oz)     Butts-   Facundoor Equation:     Weight Hvbtctduuvc=[id number]/day  Estimated calories for weight loss 1377kcal/day ( 1-2# per wk wt loss - sedentary )  Estimated protein needs 67-101g/day (1 0-1 5 gms/kg IBW )   Estimated fluid needs 2010-2345ml/day (30-35 ml/kg IBW )      Weight History:  2017: Pre-op high weight: 286lbs  Weight on Day of Weight Loss Surgery: 247lbs  Weight in (lb) to have BMI = 25: 147 95lbs  Pre-Op Excess Wt: 138lbs from high weight  Post-op [id number] 18 months post-op  Post-Op Wt Loss: 77 9#/ 56 4% EBWL in 5 5 year(s)    Took phentermine and Topamax after sleeve surgery around 2020- on and off- pt reports she was losing about 12lb a month  Stopped taking them when found out insurance did not cover revisions at that time      Review of History and Medications   Past Medical History:   Diagnosis Date   • Anemia      Past Surgical History:   Procedure Laterality Date   • APPENDECTOMY     •  SECTION     • CHOLECYSTECTOMY  2018   • GASTRIC BYPASS  2017   • NEPHRECTOMY       Social History     Socioeconomic History   • Marital status: /Civil Union     Spouse name: Not on file   • Number of children: Not on file   • Years of education: Not on file   • Highest education level: Not on file   Occupational History   • Not on file   Tobacco Use   • Smoking status: Former   • Smokeless tobacco: Never   Vaping Use   • Vaping Use: Never used Substance and Sexual Activity   • Alcohol use: Not Currently   • Drug use: Never   • Sexual activity: Not on file   Other Topics Concern   • Not on file   Social History Narrative   • Not on file     Social Determinants of Health     Financial Resource Strain: Not on file   Food Insecurity: Not on file   Transportation Needs: Not on file   Physical Activity: Not on file   Stress: Not on file   Social Connections: Not on file   Intimate Partner Violence: Not on file   Housing Stability: Not on file       Current Outpatient Medications:   •  ferrous sulfate 325 (65 Fe) mg tablet, Take 1 tablet by mouth daily with breakfast, Disp: , Rfl:   •  hydroxychloroquine (PLAQUENIL) 200 mg tablet, Take 200 mg by mouth 2 (two) times a day, Disp: , Rfl:   •  hydrOXYzine HCL (ATARAX) 25 mg tablet, Take 25 mg by mouth if needed, Disp: , Rfl:   •  naratriptan (AMERGE) 2 5 MG tablet, Take 1 tablet (2 5 mg total) by mouth as needed for migraine 2 5 mg at onset of headache, may repeat in 4 hours if needed, Disp: 9 tablet, Rfl: 3  •  pantoprazole (PROTONIX) 40 mg tablet, Take 1 tablet (40 mg total) by mouth 2 (two) times a day, Disp: 180 tablet, Rfl: 0  •  sertraline (ZOLOFT) 25 mg tablet, Take 25 mg by mouth daily, Disp: , Rfl:   •  topiramate (TOPAMAX) 25 mg tablet, 1 TABLET NIGHTLY FOR ONE WEEK AND THEN INCREASE TO 2 TABLETS NIGHTLY, Disp: 180 tablet, Rfl: 2  •  vitamin B-12 (VITAMIN B-12) 1,000 mcg tablet, Take 1 tablet by mouth daily, Disp: , Rfl:      Pt is currently taking: none currently  Has them at home  Bariatric fusion chewables  Pt is taking 65mg ferrous sulfate  Not taking B12  Pt reports history of needing IV iron infusions  History of low vitamin D levels  Food Intake and Lifestyle Assessment   Food Intake Assessment completed via usual diet recall  Currently using [employer] ana maria: [employer] Online[employer] pro/day  Breakfast: 5am: fair life protein drink on way to work    Snack: HB egg   Lunch: 11-12: [address] "shake  Snack: HB egg  Dinner: chicken breast or shrimp, vegetable steamer bag  Snack: none  Beverage intake: gatorade zero or ice water  Protein supplement: Fair Life protein drink twice daily  Estimated protein intake per day: 70-90g  Estimated fluid intake per day: >64oz  Meals eaten away from home: rare  Typical meal pattern: 1 meal, 2 meal replacement protein shakes, and 2 snacks per day  Eating Behaviors: pt is following a 60/60 rule to separate her foods and fluids  Pt does not drink and beverages with sugar or carbonation  Pt drinks two protein drinks per day and eats mainly high protein foods  Pt is not snacking/grazing and does not eat high carbohydrate/high sugar foods  Food allergies or intolerances: No Known Allergies NKFA  Some pain after eating, bloating: mainly pork  Cultural or Samaritan considerations: none    Physical Assessment  Physical Activity  Types of exercise: pt goes to the gym several days per week  Pt reprots she does not get much activity at her job is they do not go out on a call  Current physical limitations: none noted  Psychosocial Assessment   Support systems: significant other- very supportive- getting  this weekend  Socioeconomic factors: Pt moved to [place] from [place] two years ago  [employer] in [place] school at [employer]  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  [employer] 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    Nutrition Diagnosis  Diagnosis: Overweight / Obesity (NC-3 3) and Altered GI function (NC-1 4)  Related to: Altered GI function  As Evidenced by: BMI >25 and Unintentional weight gain     Nutrition Prescription: Recommend the following diet  Low fat, Low sugar, High protein and Regular    Interventions and Teaching   Discussed pre-op and post-op nutrition guidelines  Patient educated and handouts provided    Surgical changes to stomach / GI  Capacity of post-surgery stomach  Diet progression  Adequate hydration  Sugar and fat restriction to decrease \"dumping syndrome\"  Expected weight " loss  Weight loss plateaus/ possibility of weight regain  Exercise  Suggestions for pre-op diet  Nutrition considerations after surgery  Protein supplements  Meal planning and preparation  Appropriate carbohydrate, protein, and fat intake, and food/fluid choices to maximize safe weight loss, nutrient intake, and tolerance   Dietary and lifestyle changes  Possible problems with poor eating habits  Techniques for self monitoring and keeping daily food journal  Potential for food intolerance after surgery, and ways to deal with them including: lactose intolerance, nausea, reflux, vomiting, diarrhea, food intolerance, appetite changes, gas  Vitamin / Mineral supplementation of Multivitamin with minerals and Calcium    Patient is not currently pregnant and doesn't desire to become pregnant a minimum of one year post-op    Education provided to: patient    Barriers to learning: No barriers identified    Readiness to change: action    Prior research on procedure: discussed with provider and previous wt  loss surgery    Comprehension: verbalizes understanding     Expected Compliance: good    Recommendations  Pt is an appropriate candidate for surgery  Yes  Pre-op bloodwork as well as annual post-op vitamin/mineral bloodwork ordered today       Evaluation / Monitoring  Dietitian to Monitor: Eating pattern as discussed Tolerance of nutrition prescription Body weight Lab values Physical activity Bowel pattern    Goals  Food journal, Exercise 30 minutes 5 times per week, Complete lession plans 1-6 and Eat 3 meals per day    Time Spent:   1 Hour

## 2023-05-17 ENCOUNTER — OFFICE VISIT (OUTPATIENT)
Dept: BARIATRICS | Facility: CLINIC | Age: 48
End: 2023-05-17

## 2023-05-17 VITALS — BODY MASS INDEX: 35.17 KG/M2 | WEIGHT: 208.1 LBS

## 2023-05-17 DIAGNOSIS — K21.9 GERD (GASTROESOPHAGEAL REFLUX DISEASE): Primary | ICD-10-CM

## 2023-05-17 DIAGNOSIS — E66.9 OBESITY, CLASS II, BMI 35-39.9: ICD-10-CM

## 2023-05-17 DIAGNOSIS — Z98.84 BARIATRIC SURGERY STATUS: ICD-10-CM

## 2023-05-17 DIAGNOSIS — R63.5 ABNORMAL WEIGHT GAIN: ICD-10-CM

## 2023-05-17 DIAGNOSIS — K91.2 POSTSURGICAL MALABSORPTION: ICD-10-CM

## 2023-05-19 ENCOUNTER — APPOINTMENT (OUTPATIENT)
Dept: LAB | Facility: CLINIC | Age: 48
End: 2023-05-19

## 2023-05-19 DIAGNOSIS — K91.2 POSTSURGICAL MALABSORPTION: ICD-10-CM

## 2023-05-19 DIAGNOSIS — E66.9 OBESITY, CLASS II, BMI 35-39.9: ICD-10-CM

## 2023-05-19 DIAGNOSIS — Z98.84 BARIATRIC SURGERY STATUS: ICD-10-CM

## 2023-05-19 DIAGNOSIS — R63.5 ABNORMAL WEIGHT GAIN: ICD-10-CM

## 2023-05-19 LAB
25(OH)D3 SERPL-MCNC: 28 NG/ML (ref 30–100)
ALBUMIN SERPL BCP-MCNC: 3.4 G/DL (ref 3.5–5)
ALP SERPL-CCNC: 74 U/L (ref 46–116)
ALT SERPL W P-5'-P-CCNC: 28 U/L (ref 12–78)
ANION GAP SERPL CALCULATED.3IONS-SCNC: 5 MMOL/L (ref 4–13)
AST SERPL W P-5'-P-CCNC: 24 U/L (ref 5–45)
BILIRUB SERPL-MCNC: 0.43 MG/DL (ref 0.2–1)
BUN SERPL-MCNC: 15 MG/DL (ref 5–25)
CALCIUM ALBUM COR SERPL-MCNC: 9.1 MG/DL (ref 8.3–10.1)
CALCIUM SERPL-MCNC: 8.6 MG/DL (ref 8.3–10.1)
CHLORIDE SERPL-SCNC: 111 MMOL/L (ref 96–108)
CHOLEST SERPL-MCNC: 160 MG/DL
CO2 SERPL-SCNC: 23 MMOL/L (ref 21–32)
CREAT SERPL-MCNC: 1.25 MG/DL (ref 0.6–1.3)
ERYTHROCYTE [DISTWIDTH] IN BLOOD BY AUTOMATED COUNT: 11.9 % (ref 11.6–15.1)
FERRITIN SERPL-MCNC: 19 NG/ML (ref 11–307)
FOLATE SERPL-MCNC: 8.5 NG/ML
GFR SERPL CREATININE-BSD FRML MDRD: 50 ML/MIN/1.73SQ M
GLUCOSE P FAST SERPL-MCNC: 85 MG/DL (ref 65–99)
HCT VFR BLD AUTO: 37.1 % (ref 34.8–46.1)
HDLC SERPL-MCNC: 60 MG/DL
HGB BLD-MCNC: 12.7 G/DL (ref 11.5–15.4)
IRON SATN MFR SERPL: 30 % (ref 15–50)
IRON SERPL-MCNC: 84 UG/DL (ref 50–170)
LDLC SERPL CALC-MCNC: 85 MG/DL (ref 0–100)
MCH RBC QN AUTO: 31.8 PG (ref 26.8–34.3)
MCHC RBC AUTO-ENTMCNC: 34.2 G/DL (ref 31.4–37.4)
MCV RBC AUTO: 93 FL (ref 82–98)
NONHDLC SERPL-MCNC: 100 MG/DL
PLATELET # BLD AUTO: 136 THOUSANDS/UL (ref 149–390)
PMV BLD AUTO: 11.4 FL (ref 8.9–12.7)
POTASSIUM SERPL-SCNC: 3.8 MMOL/L (ref 3.5–5.3)
PROT SERPL-MCNC: 6.4 G/DL (ref 6.4–8.4)
PTH-INTACT SERPL-MCNC: 54.3 PG/ML (ref 12–88)
RBC # BLD AUTO: 4 MILLION/UL (ref 3.81–5.12)
SODIUM SERPL-SCNC: 139 MMOL/L (ref 135–147)
TIBC SERPL-MCNC: 276 UG/DL (ref 250–450)
TRIGL SERPL-MCNC: 74 MG/DL
TSH SERPL DL<=0.05 MIU/L-ACNC: 3.55 UIU/ML (ref 0.45–4.5)
VIT B12 SERPL-MCNC: 230 PG/ML (ref 180–914)
WBC # BLD AUTO: 4.07 THOUSAND/UL (ref 4.31–10.16)

## 2023-05-22 LAB — ZINC SERPL-MCNC: 70 UG/DL (ref 44–115)

## 2023-05-23 DIAGNOSIS — K91.2 POSTSURGICAL MALABSORPTION: ICD-10-CM

## 2023-05-23 DIAGNOSIS — Z98.84 BARIATRIC SURGERY STATUS: Primary | ICD-10-CM

## 2023-05-23 DIAGNOSIS — E53.8 LOW VITAMIN B12 LEVEL: ICD-10-CM

## 2023-05-23 LAB
VIT A SERPL-MCNC: 53.4 UG/DL (ref 20.1–62)
VIT B1 BLD-SCNC: 82.8 NMOL/L (ref 66.5–200)

## 2023-06-22 PROBLEM — E66.9 OBESITY, CLASS II, BMI 35-39.9: Status: ACTIVE | Noted: 2023-06-22

## 2023-06-22 NOTE — PROGRESS NOTES
Bariatric Nutrition Assessment Note  PLAN:  3 month medical weight loss: 1 of 3 pre-op revision today  level 2 revision pathway  Type of surgery    Vertical sleeve gastrectomy at [facility] in [place], [place]  Surgery Date: 2017  5 5 years post-op  Surgeon: Dr Maggy Locke is now established with Dr Blake Bradford  51 y o   female   Wt 90 9 kg (200 lb 8 oz)   BMI 33 88 kg/m²    Wt Readings from Last 3 Encounters:   23 90 9 kg (200 lb 8 oz)   23 94 4 kg (208 lb 1 6 oz)   23 92 5 kg (204 lb)     Rapides-   Brittney Equation:     Weight Disbwripkld=[id number]/day  Estimated calories for weight loss 1377kcal/day (1-2# per wk wt loss - sedentary)  Estimated protein needs 67-101g/day (1 0-1 5 gms/kg IBW)  Estimated fluid needs 2010-2345ml/day (30-35 ml/kg IBW)      Weight History:  2017: Pre-op high weight: 286lbs  Weight on Day of Weight Loss Surgery: 247lbs  Weight in (lb) to have BMI = 25: 147 95lbs  Pre-Op Excess Wt: 138lbs from high weight  Post-op [id number] 18 months post-op  Post-Op Wt Loss: 77 9#/ 56 4% EBWL in 5 5 year(s)    Took phentermine and Topamax after sleeve surgery around 2020- on and off- pt reports she was losing about 12lb a month  Stopped taking them when found out insurance did not cover revisions at that time      Review of History and Medications   Past Medical History:   Diagnosis Date   • Anemia      Past Surgical History:   Procedure Laterality Date   • APPENDECTOMY     •  SECTION     • CHOLECYSTECTOMY  2018   • GASTRIC BYPASS  2017   • NEPHRECTOMY       Social History     Socioeconomic History   • Marital status: /Civil Union     Spouse name: Not on file   • Number of children: Not on file   • Years of education: Not on file   • Highest education level: Not on file   Occupational History   • Not on file   Tobacco Use   • Smoking status: Former   • Smokeless tobacco: Never   Vaping Use   • Vaping Use: Never used   Substance and Sexual Activity   • Alcohol use: Not Currently   • Drug use: Never   • Sexual activity: Not on file   Other Topics Concern   • Not on file   Social History Narrative   • Not on file     Social Determinants of Health     Financial Resource Strain: Not on file   Food Insecurity: Not on file   Transportation Needs: Not on file   Physical Activity: Not on file   Stress: Not on file   Social Connections: Not on file   Intimate Partner Violence: Not on file   Housing Stability: Not on file       Current Outpatient Medications:   •  ferrous sulfate 325 (65 Fe) mg tablet, Take 1 tablet by mouth daily with breakfast, Disp: , Rfl:   •  hydroxychloroquine (PLAQUENIL) 200 mg tablet, Take 200 mg by mouth 2 (two) times a day, Disp: , Rfl:   •  hydrOXYzine HCL (ATARAX) 25 mg tablet, Take 25 mg by mouth if needed, Disp: , Rfl:   •  naratriptan (AMERGE) 2 5 MG tablet, Take 1 tablet (2 5 mg total) by mouth as needed for migraine 2 5 mg at onset of headache, may repeat in 4 hours if needed, Disp: 9 tablet, Rfl: 3  •  pantoprazole (PROTONIX) 40 mg tablet, Take 1 tablet (40 mg total) by mouth 2 (two) times a day, Disp: 180 tablet, Rfl: 0  •  sertraline (ZOLOFT) 25 mg tablet, Take 25 mg by mouth daily, Disp: , Rfl:   •  topiramate (TOPAMAX) 25 mg tablet, 1 TABLET NIGHTLY FOR ONE WEEK AND THEN INCREASE TO 2 TABLETS NIGHTLY, Disp: 180 tablet, Rfl: 2  •  vitamin B-12 (VITAMIN B-12) 1,000 mcg tablet, Take 1 tablet by mouth daily, Disp: , Rfl:      Pt is currently taking: none currently  Has them at home  Bariatric fusion chewables  Pt is taking 65mg ferrous sulfate  Not taking B12  Pt reports history of needing IV iron infusions  History of low vitamin D levels  Since our last visit, pt has purchased the once daily pill bariatric fusion with 45mg iron  B12 1000mcg  2000 IU vit D3  1200-1500mg calcium  Pt reports she is now taking all of these consistently daily with her other meds      Food Intake and Lifestyle Assessment   Food Intake Assessment completed via usual diet recall  Currently using [employer] ana maria: 500kcal, 79g pro/day  Pt has switched to [place] [address] and increased her calories: 1000-1200kcal/day  64oz water  Breakfast: 5am: scrambled eggs, occasional turkey gee, bagel thin half  Snack: 9:30-10:00am: cafeteria yogurt and fruit, sometimes loose granola  Lunch: 1-2pm: rotisserie, takes skin off and shreds or tuna salad or keto bread 647 bread  Snack: 4:50-5pm: piece of fruit  Dinner: 6:30-7:30pm: sirloin steak, greenbeans/steamfresh bags, half a baked potato  Snack: none  Beverage intake: gatorade zero or ice water  Protein supplement: Fair Life protein drink once daily  Estimated protein intake per day: 70-90g  Estimated fluid intake per day: >64oz  Meals eaten away from home: rare  Typical meal pattern: 3 meals, and 1-2 snacks per day  Eating Behaviors: pt is following a 60/60 rule to separate her foods and fluids  Pt does not drink and beverages with sugar or carbonation  Pt drinks two protein drinks per day and eats mainly high protein foods  Pt is not snacking/grazing and does not eat high carbohydrate/high sugar foods  Food allergies or intolerances: No Known Allergies NKFA  Some pain after eating, bloating: mainly pork  Cultural or Jew considerations: none    Physical Assessment  Physical Activity  Types of exercise: pt goes to the gym several days per week  Pt reprots she does not get much activity at her job if they do not go out on a call  Current physical limitations: none noted  Walking every workday for about a mile  Bike rides 6+ miles    Psychosocial Assessment   Support systems: significant other- very supportive- recently got   Socioeconomic factors: Pt moved to [place] from [place] two years ago  cindy in [place] school at [employer]    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    Nutrition Diagnosis-continued  Diagnosis: Overweight / Obesity (NC-3 3) "and Altered GI function (NC-1 4)  Related to: Altered GI function  As Evidenced by: BMI >25 and Unintentional weight gain     Nutrition Prescription: Recommend the following diet  Low fat, Low sugar, High protein and Regular    Interventions and Teaching   Discussed pre-op and post-op nutrition guidelines  Patient educated and handouts provided  Surgical changes to stomach / GI  Capacity of post-surgery stomach  Diet progression  Adequate hydration  Sugar and fat restriction to decrease \"dumping syndrome\"  Expected weight loss  Weight loss plateaus/ possibility of weight regain  Exercise  Suggestions for pre-op diet  Nutrition considerations after surgery  Protein supplements  Meal planning and preparation  Appropriate carbohydrate, protein, and fat intake, and food/fluid choices to maximize safe weight loss, nutrient intake, and tolerance   Dietary and lifestyle changes  Possible problems with poor eating habits  Techniques for self monitoring and keeping daily food journal  Potential for food intolerance after surgery, and ways to deal with them including: lactose intolerance, nausea, reflux, vomiting, diarrhea, food intolerance, appetite changes, gas  Vitamin / Mineral supplementation of Multivitamin with minerals and Calcium    Low vitamin D and low Vitamin B12 on recent bloodwork  Patient is not currently pregnant and doesn't desire to become pregnant a minimum of one year post-op    Education provided to: patient  Barriers to learning: No barriers identified  Readiness to change: action  Prior research on procedure: discussed with provider and previous wt  loss surgery  Comprehension: verbalizes understanding   Expected Compliance: good    Recommendations  Pt is an appropriate candidate for surgery   Yes    Evaluation / Monitoring  Dietitian to Monitor: Eating pattern as discussed Tolerance of nutrition prescription Body weight Lab values Physical activity Bowel pattern    Goals  Food journal, " Exercise 30 minutes 5 times per week, Complete lession plans 1-6 and Eat 3 meals per day    Workflow: (Incomplete in [place]):  • Psych and/or D+A Clearance: not needed  • Blood Work: done 5/19/23  • PCP letter: not needed  • Support Group: done 6/14/23  • Surgeon Appt: done 8/26/22+12/22/22  • EGD: done 12/7/22  • UGI: done 7/22/22  • Cardiac Risk Assessment: scheduled for 7/27/23  • Sleep Studies:  Not needed  • Nicotine test: nonsmoker  • 3 Month Pre-Operative Program:   o 1 of 3 today with RD  o 2 of 3 scheduled for 7/27/23 with SW  • Weight Loss: 5% wt loss=10 4lbs=Day of surgery goal of 197 7lbs  • NAFLD Score Calculated: not needed  • Hepatology consult: not needed    Time Spent:   30 minutes

## 2023-06-23 ENCOUNTER — OFFICE VISIT (OUTPATIENT)
Dept: BARIATRICS | Facility: CLINIC | Age: 48
End: 2023-06-23

## 2023-06-23 VITALS — BODY MASS INDEX: 33.88 KG/M2 | WEIGHT: 200.5 LBS

## 2023-06-23 DIAGNOSIS — Z98.84 BARIATRIC SURGERY STATUS: Primary | ICD-10-CM

## 2023-06-23 DIAGNOSIS — E66.9 OBESITY, CLASS II, BMI 35-39.9: ICD-10-CM

## 2023-06-23 PROCEDURE — RECHECK: Performed by: DIETITIAN, REGISTERED
